# Patient Record
Sex: FEMALE | Race: WHITE
[De-identification: names, ages, dates, MRNs, and addresses within clinical notes are randomized per-mention and may not be internally consistent; named-entity substitution may affect disease eponyms.]

---

## 2017-02-27 ENCOUNTER — HOSPITAL ENCOUNTER (OUTPATIENT)
Dept: HOSPITAL 15 - LAB | Age: 72
Discharge: HOME | End: 2017-02-27
Attending: INTERNAL MEDICINE
Payer: COMMERCIAL

## 2017-02-27 ENCOUNTER — HOSPITAL ENCOUNTER (OUTPATIENT)
Dept: HOSPITAL 15 - XYW | Age: 72
Discharge: HOME | End: 2017-02-27
Attending: ORTHOPAEDIC SURGERY
Payer: COMMERCIAL

## 2017-02-27 DIAGNOSIS — M16.11: ICD-10-CM

## 2017-02-27 DIAGNOSIS — I05.0: ICD-10-CM

## 2017-02-27 DIAGNOSIS — I35.0: ICD-10-CM

## 2017-02-27 DIAGNOSIS — E11.9: Primary | ICD-10-CM

## 2017-02-27 DIAGNOSIS — Z01.818: Primary | ICD-10-CM

## 2017-02-27 DIAGNOSIS — Z01.812: ICD-10-CM

## 2017-02-27 DIAGNOSIS — I10: ICD-10-CM

## 2017-02-27 LAB
ALBUMIN SERPL-MCNC: 3.3 G/DL (ref 3.4–5)
ALP SERPL-CCNC: 125 U/L (ref 45–117)
ANION GAP SERPL CALCULATED.3IONS-SCNC: 9 MMOL/L (ref 5–15)
APTT PPP: 25.2 SEC (ref 22.64–33.71)
BILIRUB SERPL-MCNC: 0.3 MG/DL (ref 0.2–1)
BUN SERPL-MCNC: 44 MG/DL (ref 7–18)
BUN/CREAT SERPL: 32.4
CALCIUM SERPL-MCNC: 10.1 MG/DL (ref 8.5–10.1)
CHLORIDE SERPL-SCNC: 103 MMOL/L (ref 98–107)
CHOLEST SERPL-MCNC: 178 MG/DL (ref ?–200)
CO2 SERPL-SCNC: 33 MMOL/L (ref 21–32)
GLUCOSE SERPL-MCNC: 108 MG/DL (ref 74–106)
HCT VFR BLD AUTO: 41.2 % (ref 36–46)
HDLC SERPL-MCNC: 56 MG/DL (ref 40–59)
HGB BLD-MCNC: 13.3 G/DL (ref 12.2–16.2)
INR PPP: 0.99 (ref 0.9–1.15)
MCH RBC QN AUTO: 28.5 PG (ref 28–32)
MCV RBC AUTO: 88.6 FL (ref 80–100)
NRBC BLD QL AUTO: 0 %
POTASSIUM SERPL-SCNC: 4.1 MMOL/L (ref 3.5–5.1)
PROT SERPL-MCNC: 7.7 G/DL (ref 6.4–8.2)
PROTHROMBIN TIME: 10.2 SEC (ref 9.37–12.3)
SODIUM SERPL-SCNC: 145 MMOL/L (ref 136–145)
TRIGL SERPL-MCNC: 164 MG/DL (ref ?–150)
VIT B12 SERPL-MCNC: 536 PG/ML (ref 211–911)

## 2017-02-27 PROCEDURE — 80053 COMPREHEN METABOLIC PANEL: CPT

## 2017-02-27 PROCEDURE — 81001 URINALYSIS AUTO W/SCOPE: CPT

## 2017-02-27 PROCEDURE — 84443 ASSAY THYROID STIM HORMONE: CPT

## 2017-02-27 PROCEDURE — 82306 VITAMIN D 25 HYDROXY: CPT

## 2017-02-27 PROCEDURE — 93306 TTE W/DOPPLER COMPLETE: CPT

## 2017-02-27 PROCEDURE — 85610 PROTHROMBIN TIME: CPT

## 2017-02-27 PROCEDURE — 85025 COMPLETE CBC W/AUTO DIFF WBC: CPT

## 2017-02-27 PROCEDURE — 80061 LIPID PANEL: CPT

## 2017-02-27 PROCEDURE — 36415 COLL VENOUS BLD VENIPUNCTURE: CPT

## 2017-02-27 PROCEDURE — 84439 ASSAY OF FREE THYROXINE: CPT

## 2017-02-27 PROCEDURE — 85652 RBC SED RATE AUTOMATED: CPT

## 2017-02-27 PROCEDURE — 85730 THROMBOPLASTIN TIME PARTIAL: CPT

## 2017-02-27 PROCEDURE — 83036 HEMOGLOBIN GLYCOSYLATED A1C: CPT

## 2017-02-27 PROCEDURE — 82607 VITAMIN B-12: CPT

## 2017-03-14 ENCOUNTER — HOSPITAL ENCOUNTER (OUTPATIENT)
Dept: HOSPITAL 15 - XY | Age: 72
Discharge: HOME | End: 2017-03-14
Attending: INTERNAL MEDICINE
Payer: COMMERCIAL

## 2017-03-14 VITALS — HEIGHT: 67 IN | WEIGHT: 160 LBS | BODY MASS INDEX: 25.11 KG/M2

## 2017-03-14 DIAGNOSIS — Z01.810: Primary | ICD-10-CM

## 2017-03-14 PROCEDURE — 96374 THER/PROPH/DIAG INJ IV PUSH: CPT

## 2017-03-14 PROCEDURE — 78452 HT MUSCLE IMAGE SPECT MULT: CPT

## 2017-03-14 PROCEDURE — 93017 CV STRESS TEST TRACING ONLY: CPT

## 2017-03-20 ENCOUNTER — HOSPITAL ENCOUNTER (INPATIENT)
Dept: HOSPITAL 15 - ER | Age: 72
LOS: 2 days | Discharge: HOME | DRG: 314 | End: 2017-03-22
Attending: INTERNAL MEDICINE | Admitting: INTERNAL MEDICINE
Payer: COMMERCIAL

## 2017-03-20 VITALS — DIASTOLIC BLOOD PRESSURE: 81 MMHG | SYSTOLIC BLOOD PRESSURE: 142 MMHG

## 2017-03-20 VITALS — SYSTOLIC BLOOD PRESSURE: 137 MMHG | DIASTOLIC BLOOD PRESSURE: 89 MMHG

## 2017-03-20 VITALS — WEIGHT: 153.44 LBS | BODY MASS INDEX: 24.08 KG/M2 | HEIGHT: 67 IN

## 2017-03-20 VITALS — SYSTOLIC BLOOD PRESSURE: 148 MMHG | DIASTOLIC BLOOD PRESSURE: 72 MMHG

## 2017-03-20 DIAGNOSIS — N18.3: ICD-10-CM

## 2017-03-20 DIAGNOSIS — E11.21: ICD-10-CM

## 2017-03-20 DIAGNOSIS — Z90.710: ICD-10-CM

## 2017-03-20 DIAGNOSIS — Z85.41: ICD-10-CM

## 2017-03-20 DIAGNOSIS — M16.11: ICD-10-CM

## 2017-03-20 DIAGNOSIS — N39.0: ICD-10-CM

## 2017-03-20 DIAGNOSIS — E83.52: ICD-10-CM

## 2017-03-20 DIAGNOSIS — Z90.89: ICD-10-CM

## 2017-03-20 DIAGNOSIS — Z79.899: ICD-10-CM

## 2017-03-20 DIAGNOSIS — I12.9: ICD-10-CM

## 2017-03-20 DIAGNOSIS — R55: ICD-10-CM

## 2017-03-20 DIAGNOSIS — N17.0: ICD-10-CM

## 2017-03-20 DIAGNOSIS — E11.22: ICD-10-CM

## 2017-03-20 DIAGNOSIS — Z90.49: ICD-10-CM

## 2017-03-20 DIAGNOSIS — I95.9: Primary | ICD-10-CM

## 2017-03-20 DIAGNOSIS — B96.20: ICD-10-CM

## 2017-03-20 DIAGNOSIS — E78.5: ICD-10-CM

## 2017-03-20 LAB
ALBUMIN SERPL-MCNC: 3.4 G/DL (ref 3.4–5)
ALP SERPL-CCNC: 110 U/L (ref 45–117)
ANION GAP SERPL CALCULATED.3IONS-SCNC: 10 MMOL/L (ref 5–15)
BILIRUB SERPL-MCNC: 0.3 MG/DL (ref 0.2–1)
BUN SERPL-MCNC: 47 MG/DL (ref 7–18)
BUN/CREAT SERPL: 31.8
CALCIUM SERPL-MCNC: 10.4 MG/DL (ref 8.5–10.1)
CHLORIDE SERPL-SCNC: 103 MMOL/L (ref 98–107)
CO2 SERPL-SCNC: 31 MMOL/L (ref 21–32)
GLUCOSE SERPL-MCNC: 86 MG/DL (ref 74–106)
HCT VFR BLD AUTO: 41.7 % (ref 36–46)
HGB BLD-MCNC: 13.6 G/DL (ref 12.2–16.2)
MAGNESIUM SERPL-MCNC: 2.3 MG/DL (ref 1.6–2.6)
MCH RBC QN AUTO: 28.8 PG (ref 28–32)
MCV RBC AUTO: 88.5 FL (ref 80–100)
NRBC BLD QL AUTO: 0 %
POTASSIUM SERPL-SCNC: 4 MMOL/L (ref 3.5–5.1)
PROT SERPL-MCNC: 7.6 G/DL (ref 6.4–8.2)
SODIUM SERPL-SCNC: 144 MMOL/L (ref 136–145)

## 2017-03-20 PROCEDURE — 85025 COMPLETE CBC W/AUTO DIFF WBC: CPT

## 2017-03-20 PROCEDURE — 78582 LUNG VENTILAT&PERFUS IMAGING: CPT

## 2017-03-20 PROCEDURE — 80061 LIPID PANEL: CPT

## 2017-03-20 PROCEDURE — 84484 ASSAY OF TROPONIN QUANT: CPT

## 2017-03-20 PROCEDURE — 87088 URINE BACTERIA CULTURE: CPT

## 2017-03-20 PROCEDURE — 84443 ASSAY THYROID STIM HORMONE: CPT

## 2017-03-20 PROCEDURE — 80048 BASIC METABOLIC PNL TOTAL CA: CPT

## 2017-03-20 PROCEDURE — 93005 ELECTROCARDIOGRAM TRACING: CPT

## 2017-03-20 PROCEDURE — 72192 CT PELVIS W/O DYE: CPT

## 2017-03-20 PROCEDURE — 36415 COLL VENOUS BLD VENIPUNCTURE: CPT

## 2017-03-20 PROCEDURE — 82962 GLUCOSE BLOOD TEST: CPT

## 2017-03-20 PROCEDURE — 87086 URINE CULTURE/COLONY COUNT: CPT

## 2017-03-20 PROCEDURE — 80053 COMPREHEN METABOLIC PANEL: CPT

## 2017-03-20 PROCEDURE — 81001 URINALYSIS AUTO W/SCOPE: CPT

## 2017-03-20 PROCEDURE — 85379 FIBRIN DEGRADATION QUANT: CPT

## 2017-03-20 PROCEDURE — 93971 EXTREMITY STUDY: CPT

## 2017-03-20 PROCEDURE — 87186 SC STD MICRODIL/AGAR DIL: CPT

## 2017-03-20 PROCEDURE — 94761 N-INVAS EAR/PLS OXIMETRY MLT: CPT

## 2017-03-20 PROCEDURE — 83036 HEMOGLOBIN GLYCOSYLATED A1C: CPT

## 2017-03-20 PROCEDURE — 83735 ASSAY OF MAGNESIUM: CPT

## 2017-03-20 RX ADMIN — SODIUM CHLORIDE SCH ML: 9 INJECTION INTRAMUSCULAR; INTRAVENOUS; SUBCUTANEOUS at 14:21

## 2017-03-20 RX ADMIN — HUMAN INSULIN SCH UNITS: 100 INJECTION, SOLUTION SUBCUTANEOUS at 22:00

## 2017-03-20 RX ADMIN — Medication SCH STRIP: at 17:01

## 2017-03-20 RX ADMIN — HUMAN INSULIN SCH UNITS: 100 INJECTION, SOLUTION SUBCUTANEOUS at 17:00

## 2017-03-20 RX ADMIN — Medication SCH STRIP: at 22:09

## 2017-03-20 RX ADMIN — HYDROCODONE BITARTRATE AND ACETAMINOPHEN PRN TAB: 5; 325 TABLET ORAL at 22:17

## 2017-03-20 RX ADMIN — SODIUM CHLORIDE SCH ML: 9 INJECTION INTRAMUSCULAR; INTRAVENOUS; SUBCUTANEOUS at 22:09

## 2017-03-21 VITALS — DIASTOLIC BLOOD PRESSURE: 89 MMHG | SYSTOLIC BLOOD PRESSURE: 160 MMHG

## 2017-03-21 VITALS — DIASTOLIC BLOOD PRESSURE: 73 MMHG | SYSTOLIC BLOOD PRESSURE: 111 MMHG

## 2017-03-21 VITALS — SYSTOLIC BLOOD PRESSURE: 146 MMHG | DIASTOLIC BLOOD PRESSURE: 76 MMHG

## 2017-03-21 VITALS — SYSTOLIC BLOOD PRESSURE: 146 MMHG | DIASTOLIC BLOOD PRESSURE: 92 MMHG

## 2017-03-21 VITALS — DIASTOLIC BLOOD PRESSURE: 66 MMHG | SYSTOLIC BLOOD PRESSURE: 152 MMHG

## 2017-03-21 VITALS — SYSTOLIC BLOOD PRESSURE: 135 MMHG | DIASTOLIC BLOOD PRESSURE: 75 MMHG

## 2017-03-21 VITALS — DIASTOLIC BLOOD PRESSURE: 74 MMHG | SYSTOLIC BLOOD PRESSURE: 166 MMHG

## 2017-03-21 VITALS — DIASTOLIC BLOOD PRESSURE: 70 MMHG | SYSTOLIC BLOOD PRESSURE: 106 MMHG

## 2017-03-21 LAB
ALBUMIN SERPL-MCNC: 3.1 G/DL (ref 3.4–5)
ALP SERPL-CCNC: 97 U/L (ref 45–117)
ANION GAP SERPL CALCULATED.3IONS-SCNC: 12 MMOL/L (ref 5–15)
BILIRUB SERPL-MCNC: 0.4 MG/DL (ref 0.2–1)
BUN SERPL-MCNC: 40 MG/DL (ref 7–18)
BUN/CREAT SERPL: 35.1
CALCIUM SERPL-MCNC: 9.6 MG/DL (ref 8.5–10.1)
CHLORIDE SERPL-SCNC: 104 MMOL/L (ref 98–107)
CO2 SERPL-SCNC: 30 MMOL/L (ref 21–32)
GLUCOSE SERPL-MCNC: 137 MG/DL (ref 74–106)
HCT VFR BLD AUTO: 39.5 % (ref 36–46)
HGB BLD-MCNC: 12.8 G/DL (ref 12.2–16.2)
MCH RBC QN AUTO: 28.7 PG (ref 28–32)
MCV RBC AUTO: 88.8 FL (ref 80–100)
NRBC BLD QL AUTO: 0 %
POTASSIUM SERPL-SCNC: 3.5 MMOL/L (ref 3.5–5.1)
PROT SERPL-MCNC: 7.2 G/DL (ref 6.4–8.2)
SODIUM SERPL-SCNC: 146 MMOL/L (ref 136–145)

## 2017-03-21 RX ADMIN — SODIUM CHLORIDE SCH MLS/HR: 0.9 INJECTION, SOLUTION INTRAVENOUS at 18:10

## 2017-03-21 RX ADMIN — LISINOPRIL SCH MG: 20 TABLET ORAL at 09:49

## 2017-03-21 RX ADMIN — CHOLESTYRAMINE SCH GM: 4 POWDER, FOR SUSPENSION ORAL at 11:00

## 2017-03-21 RX ADMIN — CEFTRIAXONE SODIUM SCH MLS/HR: 1 INJECTION, POWDER, FOR SOLUTION INTRAMUSCULAR; INTRAVENOUS at 09:33

## 2017-03-21 RX ADMIN — HYDROCODONE BITARTRATE AND ACETAMINOPHEN PRN TAB: 5; 325 TABLET ORAL at 15:29

## 2017-03-21 RX ADMIN — SODIUM CHLORIDE SCH ML: 9 INJECTION INTRAMUSCULAR; INTRAVENOUS; SUBCUTANEOUS at 14:19

## 2017-03-21 RX ADMIN — METOPROLOL SUCCINATE SCH MG: 50 TABLET, EXTENDED RELEASE ORAL at 09:48

## 2017-03-21 RX ADMIN — HYDROCODONE BITARTRATE AND ACETAMINOPHEN PRN TAB: 5; 325 TABLET ORAL at 21:48

## 2017-03-21 RX ADMIN — MULTIVITAMIN TABLET SCH TAB: TABLET at 09:47

## 2017-03-21 RX ADMIN — SODIUM CHLORIDE SCH ML: 9 INJECTION INTRAMUSCULAR; INTRAVENOUS; SUBCUTANEOUS at 06:29

## 2017-03-21 RX ADMIN — ASPIRIN SCH MG: 81 TABLET ORAL at 09:37

## 2017-03-21 RX ADMIN — SODIUM CHLORIDE SCH ML: 9 INJECTION INTRAMUSCULAR; INTRAVENOUS; SUBCUTANEOUS at 22:16

## 2017-03-21 RX ADMIN — HUMAN INSULIN SCH UNITS: 100 INJECTION, SOLUTION SUBCUTANEOUS at 22:00

## 2017-03-21 RX ADMIN — Medication SCH STRIP: at 16:41

## 2017-03-21 RX ADMIN — HUMAN INSULIN SCH UNITS: 100 INJECTION, SOLUTION SUBCUTANEOUS at 16:42

## 2017-03-21 RX ADMIN — FAMOTIDINE SCH MG: 20 TABLET, FILM COATED ORAL at 09:33

## 2017-03-21 RX ADMIN — FAMOTIDINE SCH MG: 20 TABLET, FILM COATED ORAL at 21:48

## 2017-03-21 RX ADMIN — CHOLESTYRAMINE SCH GM: 4 POWDER, FOR SUSPENSION ORAL at 11:14

## 2017-03-21 RX ADMIN — HYDROCODONE BITARTRATE AND ACETAMINOPHEN PRN TAB: 5; 325 TABLET ORAL at 07:07

## 2017-03-21 RX ADMIN — HUMAN INSULIN SCH UNITS: 100 INJECTION, SOLUTION SUBCUTANEOUS at 11:30

## 2017-03-21 RX ADMIN — HUMAN INSULIN SCH UNITS: 100 INJECTION, SOLUTION SUBCUTANEOUS at 06:30

## 2017-03-21 RX ADMIN — Medication SCH STRIP: at 22:16

## 2017-03-21 RX ADMIN — Medication SCH STRIP: at 11:14

## 2017-03-21 RX ADMIN — Medication SCH STRIP: at 06:29

## 2017-03-22 VITALS — DIASTOLIC BLOOD PRESSURE: 88 MMHG | SYSTOLIC BLOOD PRESSURE: 160 MMHG

## 2017-03-22 VITALS — SYSTOLIC BLOOD PRESSURE: 134 MMHG | DIASTOLIC BLOOD PRESSURE: 77 MMHG

## 2017-03-22 VITALS — SYSTOLIC BLOOD PRESSURE: 123 MMHG | DIASTOLIC BLOOD PRESSURE: 86 MMHG

## 2017-03-22 LAB
ANION GAP SERPL CALCULATED.3IONS-SCNC: 9 MMOL/L (ref 5–15)
BUN SERPL-MCNC: 29 MG/DL (ref 7–18)
BUN/CREAT SERPL: 27.6
CALCIUM SERPL-MCNC: 9.5 MG/DL (ref 8.5–10.1)
CHLORIDE SERPL-SCNC: 104 MMOL/L (ref 98–107)
CHOLEST SERPL-MCNC: 187 MG/DL (ref ?–200)
CO2 SERPL-SCNC: 29 MMOL/L (ref 21–32)
GLUCOSE SERPL-MCNC: 128 MG/DL (ref 74–106)
HDLC SERPL-MCNC: 53 MG/DL (ref 40–59)
POTASSIUM SERPL-SCNC: 3.9 MMOL/L (ref 3.5–5.1)
SODIUM SERPL-SCNC: 142 MMOL/L (ref 136–145)
TRIGL SERPL-MCNC: 126 MG/DL (ref ?–150)

## 2017-03-22 RX ADMIN — HUMAN INSULIN SCH UNITS: 100 INJECTION, SOLUTION SUBCUTANEOUS at 06:05

## 2017-03-22 RX ADMIN — ASPIRIN SCH MG: 81 TABLET ORAL at 09:54

## 2017-03-22 RX ADMIN — METOPROLOL SUCCINATE SCH MG: 50 TABLET, EXTENDED RELEASE ORAL at 09:57

## 2017-03-22 RX ADMIN — HUMAN INSULIN SCH UNITS: 100 INJECTION, SOLUTION SUBCUTANEOUS at 11:29

## 2017-03-22 RX ADMIN — SODIUM CHLORIDE SCH ML: 9 INJECTION INTRAMUSCULAR; INTRAVENOUS; SUBCUTANEOUS at 05:45

## 2017-03-22 RX ADMIN — SODIUM CHLORIDE SCH ML: 9 INJECTION INTRAMUSCULAR; INTRAVENOUS; SUBCUTANEOUS at 13:48

## 2017-03-22 RX ADMIN — Medication SCH STRIP: at 11:29

## 2017-03-22 RX ADMIN — SODIUM CHLORIDE SCH MLS/HR: 0.9 INJECTION, SOLUTION INTRAVENOUS at 08:13

## 2017-03-22 RX ADMIN — LISINOPRIL SCH MG: 20 TABLET ORAL at 09:58

## 2017-03-22 RX ADMIN — MULTIVITAMIN TABLET SCH TAB: TABLET at 09:54

## 2017-03-22 RX ADMIN — CHOLESTYRAMINE SCH GM: 4 POWDER, FOR SUSPENSION ORAL at 10:32

## 2017-03-22 RX ADMIN — FAMOTIDINE SCH MG: 20 TABLET, FILM COATED ORAL at 08:14

## 2017-03-22 RX ADMIN — CEFTRIAXONE SODIUM SCH MLS/HR: 1 INJECTION, POWDER, FOR SOLUTION INTRAMUSCULAR; INTRAVENOUS at 08:13

## 2017-03-22 RX ADMIN — HYDROCODONE BITARTRATE AND ACETAMINOPHEN PRN TAB: 5; 325 TABLET ORAL at 08:14

## 2017-03-22 RX ADMIN — Medication SCH STRIP: at 06:05

## 2017-04-03 ENCOUNTER — HOSPITAL ENCOUNTER (OUTPATIENT)
Dept: HOSPITAL 15 - LAB | Age: 72
Discharge: HOME | End: 2017-04-03
Attending: INTERNAL MEDICINE
Payer: COMMERCIAL

## 2017-04-03 DIAGNOSIS — Z00.00: Primary | ICD-10-CM

## 2017-04-03 LAB
ANION GAP SERPL CALCULATED.3IONS-SCNC: 7 MMOL/L (ref 5–15)
BUN SERPL-MCNC: 42 MG/DL (ref 7–18)
BUN/CREAT SERPL: 32.3
CALCIUM SERPL-MCNC: 10.1 MG/DL (ref 8.5–10.1)
CHLORIDE SERPL-SCNC: 103 MMOL/L (ref 98–107)
CO2 SERPL-SCNC: 34 MMOL/L (ref 21–32)
GLUCOSE SERPL-MCNC: 131 MG/DL (ref 74–106)
POTASSIUM SERPL-SCNC: 4.2 MMOL/L (ref 3.5–5.1)
SODIUM SERPL-SCNC: 144 MMOL/L (ref 136–145)

## 2017-04-03 PROCEDURE — 84443 ASSAY THYROID STIM HORMONE: CPT

## 2017-04-03 PROCEDURE — 80048 BASIC METABOLIC PNL TOTAL CA: CPT

## 2017-04-03 PROCEDURE — 36415 COLL VENOUS BLD VENIPUNCTURE: CPT

## 2017-05-05 LAB
ALBUMIN SERPL-MCNC: 3.5 G/DL (ref 3.4–5)
ALP SERPL-CCNC: 103 U/L (ref 45–117)
ANION GAP SERPL CALCULATED.3IONS-SCNC: 6 MMOL/L (ref 5–15)
APTT PPP: 24.5 SEC (ref 22.64–33.71)
BILIRUB SERPL-MCNC: 0.4 MG/DL (ref 0.2–1)
BUN SERPL-MCNC: 47 MG/DL (ref 7–18)
BUN/CREAT SERPL: 37.3
CALCIUM SERPL-MCNC: 10.1 MG/DL (ref 8.5–10.1)
CHLORIDE SERPL-SCNC: 100 MMOL/L (ref 98–107)
CO2 SERPL-SCNC: 32 MMOL/L (ref 21–32)
GLUCOSE SERPL-MCNC: 105 MG/DL (ref 74–106)
HCT VFR BLD AUTO: 40.3 % (ref 36–46)
HGB BLD-MCNC: 13.1 G/DL (ref 12.2–16.2)
INR PPP: 0.93 (ref 0.9–1.15)
MCH RBC QN AUTO: 29.5 PG (ref 28–32)
MCV RBC AUTO: 90.5 FL (ref 80–100)
NRBC BLD QL AUTO: 0 %
POTASSIUM SERPL-SCNC: 3.8 MMOL/L (ref 3.5–5.1)
PROT SERPL-MCNC: 7.8 G/DL (ref 6.4–8.2)
PROTHROMBIN TIME: 10 SEC (ref 9.37–12.3)
SODIUM SERPL-SCNC: 138 MMOL/L (ref 136–145)

## 2017-05-09 ENCOUNTER — HOSPITAL ENCOUNTER (INPATIENT)
Dept: HOSPITAL 15 - SUR | Age: 72
LOS: 7 days | Discharge: HOME | DRG: 470 | End: 2017-05-16
Attending: INTERNAL MEDICINE | Admitting: ORTHOPAEDIC SURGERY
Payer: COMMERCIAL

## 2017-05-09 VITALS — SYSTOLIC BLOOD PRESSURE: 114 MMHG | DIASTOLIC BLOOD PRESSURE: 66 MMHG

## 2017-05-09 VITALS — SYSTOLIC BLOOD PRESSURE: 121 MMHG | DIASTOLIC BLOOD PRESSURE: 59 MMHG

## 2017-05-09 VITALS — HEIGHT: 65 IN | WEIGHT: 168.65 LBS | BODY MASS INDEX: 28.1 KG/M2

## 2017-05-09 VITALS — DIASTOLIC BLOOD PRESSURE: 59 MMHG | SYSTOLIC BLOOD PRESSURE: 121 MMHG

## 2017-05-09 DIAGNOSIS — N18.3: ICD-10-CM

## 2017-05-09 DIAGNOSIS — Z85.41: ICD-10-CM

## 2017-05-09 DIAGNOSIS — Z92.3: ICD-10-CM

## 2017-05-09 DIAGNOSIS — E11.22: ICD-10-CM

## 2017-05-09 DIAGNOSIS — M16.11: Primary | ICD-10-CM

## 2017-05-09 DIAGNOSIS — M70.60: ICD-10-CM

## 2017-05-09 DIAGNOSIS — I12.9: ICD-10-CM

## 2017-05-09 PROCEDURE — 86850 RBC ANTIBODY SCREEN: CPT

## 2017-05-09 PROCEDURE — 87205 SMEAR GRAM STAIN: CPT

## 2017-05-09 PROCEDURE — 80053 COMPREHEN METABOLIC PANEL: CPT

## 2017-05-09 PROCEDURE — 84132 ASSAY OF SERUM POTASSIUM: CPT

## 2017-05-09 PROCEDURE — 97116 GAIT TRAINING THERAPY: CPT

## 2017-05-09 PROCEDURE — 0SR90JZ REPLACEMENT OF RIGHT HIP JOINT WITH SYNTHETIC SUBSTITUTE, OPEN APPROACH: ICD-10-PCS | Performed by: ORTHOPAEDIC SURGERY

## 2017-05-09 PROCEDURE — 86901 BLOOD TYPING SEROLOGIC RH(D): CPT

## 2017-05-09 PROCEDURE — 80048 BASIC METABOLIC PNL TOTAL CA: CPT

## 2017-05-09 PROCEDURE — 76775 US EXAM ABDO BACK WALL LIM: CPT

## 2017-05-09 PROCEDURE — 87075 CULTR BACTERIA EXCEPT BLOOD: CPT

## 2017-05-09 PROCEDURE — 85014 HEMATOCRIT: CPT

## 2017-05-09 PROCEDURE — 97530 THERAPEUTIC ACTIVITIES: CPT

## 2017-05-09 PROCEDURE — 73501 X-RAY EXAM HIP UNI 1 VIEW: CPT

## 2017-05-09 PROCEDURE — 86900 BLOOD TYPING SEROLOGIC ABO: CPT

## 2017-05-09 PROCEDURE — 85025 COMPLETE CBC W/AUTO DIFF WBC: CPT

## 2017-05-09 PROCEDURE — 85730 THROMBOPLASTIN TIME PARTIAL: CPT

## 2017-05-09 PROCEDURE — 85610 PROTHROMBIN TIME: CPT

## 2017-05-09 PROCEDURE — 81001 URINALYSIS AUTO W/SCOPE: CPT

## 2017-05-09 PROCEDURE — 36415 COLL VENOUS BLD VENIPUNCTURE: CPT

## 2017-05-09 PROCEDURE — 97110 THERAPEUTIC EXERCISES: CPT

## 2017-05-09 PROCEDURE — 87070 CULTURE OTHR SPECIMN AEROBIC: CPT

## 2017-05-09 PROCEDURE — 85018 HEMOGLOBIN: CPT

## 2017-05-09 RX ADMIN — HYDROCODONE BITARTRATE AND ACETAMINOPHEN PRN TAB: 10; 325 TABLET ORAL at 19:41

## 2017-05-09 RX ADMIN — SODIUM CHLORIDE, SODIUM LACTATE, POTASSIUM CHLORIDE, AND CALCIUM CHLORIDE SCH MLS/HR: .6; .31; .03; .02 INJECTION, SOLUTION INTRAVENOUS at 18:19

## 2017-05-09 RX ADMIN — CEFAZOLIN SODIUM SCH MLS/HR: 1 INJECTION, SOLUTION INTRAVENOUS at 18:19

## 2017-05-09 RX ADMIN — HYDROMORPHONE HYDROCHLORIDE PRN MG: 2 INJECTION, SOLUTION INTRAMUSCULAR; INTRAVENOUS; SUBCUTANEOUS at 23:13

## 2017-05-09 RX ADMIN — HYDROMORPHONE HYDROCHLORIDE PRN MG: 2 INJECTION, SOLUTION INTRAMUSCULAR; INTRAVENOUS; SUBCUTANEOUS at 20:53

## 2017-05-09 RX ADMIN — DOCUSATE SODIUM SCH MG: 100 CAPSULE, LIQUID FILLED ORAL at 22:13

## 2017-05-09 RX ADMIN — HYDROMORPHONE HYDROCHLORIDE PRN MG: 2 INJECTION, SOLUTION INTRAMUSCULAR; INTRAVENOUS; SUBCUTANEOUS at 18:48

## 2017-05-09 RX ADMIN — SODIUM CHLORIDE SCH ML: 9 INJECTION INTRAMUSCULAR; INTRAVENOUS; SUBCUTANEOUS at 22:13

## 2017-05-10 VITALS — DIASTOLIC BLOOD PRESSURE: 67 MMHG | SYSTOLIC BLOOD PRESSURE: 129 MMHG

## 2017-05-10 VITALS — DIASTOLIC BLOOD PRESSURE: 59 MMHG | SYSTOLIC BLOOD PRESSURE: 122 MMHG

## 2017-05-10 VITALS — DIASTOLIC BLOOD PRESSURE: 59 MMHG | SYSTOLIC BLOOD PRESSURE: 112 MMHG

## 2017-05-10 VITALS — DIASTOLIC BLOOD PRESSURE: 67 MMHG | SYSTOLIC BLOOD PRESSURE: 131 MMHG

## 2017-05-10 VITALS — DIASTOLIC BLOOD PRESSURE: 60 MMHG | SYSTOLIC BLOOD PRESSURE: 112 MMHG

## 2017-05-10 LAB
HCT VFR BLD AUTO: 35.9 % (ref 36–46)
HGB BLD-MCNC: 11.7 G/DL (ref 12.2–16.2)

## 2017-05-10 RX ADMIN — HYDROCODONE BITARTRATE AND ACETAMINOPHEN PRN TAB: 10; 325 TABLET ORAL at 06:53

## 2017-05-10 RX ADMIN — TEMAZEPAM PRN MG: 15 CAPSULE ORAL at 01:52

## 2017-05-10 RX ADMIN — HYDROCODONE BITARTRATE AND ACETAMINOPHEN PRN TAB: 10; 325 TABLET ORAL at 15:44

## 2017-05-10 RX ADMIN — SODIUM CHLORIDE SCH ML: 9 INJECTION INTRAMUSCULAR; INTRAVENOUS; SUBCUTANEOUS at 06:03

## 2017-05-10 RX ADMIN — DOCUSATE SODIUM SCH MG: 100 CAPSULE, LIQUID FILLED ORAL at 20:59

## 2017-05-10 RX ADMIN — HYDROMORPHONE HYDROCHLORIDE PRN MG: 2 INJECTION, SOLUTION INTRAMUSCULAR; INTRAVENOUS; SUBCUTANEOUS at 01:52

## 2017-05-10 RX ADMIN — HYDROMORPHONE HYDROCHLORIDE PRN MG: 2 INJECTION, SOLUTION INTRAMUSCULAR; INTRAVENOUS; SUBCUTANEOUS at 07:56

## 2017-05-10 RX ADMIN — ENOXAPARIN SODIUM SCH MG: 40 INJECTION SUBCUTANEOUS at 10:04

## 2017-05-10 RX ADMIN — MORPHINE SULFATE PRN MG: 2 INJECTION, SOLUTION INTRAMUSCULAR; INTRAVENOUS at 18:49

## 2017-05-10 RX ADMIN — HYDROMORPHONE HYDROCHLORIDE PRN MG: 2 INJECTION, SOLUTION INTRAMUSCULAR; INTRAVENOUS; SUBCUTANEOUS at 06:04

## 2017-05-10 RX ADMIN — CEFAZOLIN SODIUM SCH MLS/HR: 1 INJECTION, SOLUTION INTRAVENOUS at 00:14

## 2017-05-10 RX ADMIN — SODIUM CHLORIDE SCH ML: 9 INJECTION INTRAMUSCULAR; INTRAVENOUS; SUBCUTANEOUS at 20:59

## 2017-05-10 RX ADMIN — SODIUM CHLORIDE SCH ML: 9 INJECTION INTRAMUSCULAR; INTRAVENOUS; SUBCUTANEOUS at 14:03

## 2017-05-10 RX ADMIN — CEFAZOLIN SODIUM SCH MLS/HR: 1 INJECTION, SOLUTION INTRAVENOUS at 06:03

## 2017-05-10 RX ADMIN — DOCUSATE SODIUM SCH MG: 100 CAPSULE, LIQUID FILLED ORAL at 10:03

## 2017-05-10 RX ADMIN — SODIUM CHLORIDE, SODIUM LACTATE, POTASSIUM CHLORIDE, AND CALCIUM CHLORIDE SCH MLS/HR: .6; .31; .03; .02 INJECTION, SOLUTION INTRAVENOUS at 12:25

## 2017-05-10 RX ADMIN — HYDROCODONE BITARTRATE AND ACETAMINOPHEN PRN TAB: 10; 325 TABLET ORAL at 10:49

## 2017-05-11 VITALS — SYSTOLIC BLOOD PRESSURE: 98 MMHG | DIASTOLIC BLOOD PRESSURE: 60 MMHG

## 2017-05-11 VITALS — SYSTOLIC BLOOD PRESSURE: 149 MMHG | DIASTOLIC BLOOD PRESSURE: 77 MMHG

## 2017-05-11 VITALS — SYSTOLIC BLOOD PRESSURE: 116 MMHG | DIASTOLIC BLOOD PRESSURE: 72 MMHG

## 2017-05-11 VITALS — SYSTOLIC BLOOD PRESSURE: 96 MMHG | DIASTOLIC BLOOD PRESSURE: 61 MMHG

## 2017-05-11 VITALS — DIASTOLIC BLOOD PRESSURE: 80 MMHG | SYSTOLIC BLOOD PRESSURE: 157 MMHG

## 2017-05-11 LAB
ANION GAP SERPL CALCULATED.3IONS-SCNC: 7 MMOL/L (ref 5–15)
BUN SERPL-MCNC: 24 MG/DL (ref 7–18)
BUN/CREAT SERPL: 19.7
CALCIUM SERPL-MCNC: 9.6 MG/DL (ref 8.5–10.1)
CHLORIDE SERPL-SCNC: 98 MMOL/L (ref 98–107)
CO2 SERPL-SCNC: 31 MMOL/L (ref 21–32)
GLUCOSE SERPL-MCNC: 150 MG/DL (ref 74–106)
HCT VFR BLD AUTO: 35.2 % (ref 36–46)
HGB BLD-MCNC: 11.7 G/DL (ref 12.2–16.2)
POTASSIUM SERPL-SCNC: 3.6 MMOL/L (ref 3.5–5.1)
SODIUM SERPL-SCNC: 136 MMOL/L (ref 136–145)

## 2017-05-11 RX ADMIN — MORPHINE SULFATE PRN MG: 2 INJECTION, SOLUTION INTRAMUSCULAR; INTRAVENOUS at 05:25

## 2017-05-11 RX ADMIN — SODIUM CHLORIDE SCH ML: 9 INJECTION INTRAMUSCULAR; INTRAVENOUS; SUBCUTANEOUS at 21:36

## 2017-05-11 RX ADMIN — HYDROCODONE BITARTRATE AND ACETAMINOPHEN PRN TAB: 10; 325 TABLET ORAL at 08:41

## 2017-05-11 RX ADMIN — HYDROMORPHONE HYDROCHLORIDE PRN MG: 2 INJECTION, SOLUTION INTRAMUSCULAR; INTRAVENOUS; SUBCUTANEOUS at 16:38

## 2017-05-11 RX ADMIN — ENOXAPARIN SODIUM SCH MG: 40 INJECTION SUBCUTANEOUS at 10:36

## 2017-05-11 RX ADMIN — DOCUSATE SODIUM SCH MG: 100 CAPSULE, LIQUID FILLED ORAL at 10:36

## 2017-05-11 RX ADMIN — DOCUSATE SODIUM SCH MG: 100 CAPSULE, LIQUID FILLED ORAL at 21:36

## 2017-05-11 RX ADMIN — SODIUM CHLORIDE SCH ML: 9 INJECTION INTRAMUSCULAR; INTRAVENOUS; SUBCUTANEOUS at 14:41

## 2017-05-11 RX ADMIN — HYDROMORPHONE HYDROCHLORIDE PRN MG: 2 INJECTION, SOLUTION INTRAMUSCULAR; INTRAVENOUS; SUBCUTANEOUS at 10:36

## 2017-05-11 RX ADMIN — SODIUM CHLORIDE SCH ML: 9 INJECTION INTRAMUSCULAR; INTRAVENOUS; SUBCUTANEOUS at 05:25

## 2017-05-11 RX ADMIN — CELECOXIB SCH MG: 100 CAPSULE ORAL at 10:00

## 2017-05-12 VITALS — SYSTOLIC BLOOD PRESSURE: 148 MMHG | DIASTOLIC BLOOD PRESSURE: 71 MMHG

## 2017-05-12 VITALS — SYSTOLIC BLOOD PRESSURE: 117 MMHG | DIASTOLIC BLOOD PRESSURE: 73 MMHG

## 2017-05-12 VITALS — DIASTOLIC BLOOD PRESSURE: 56 MMHG | SYSTOLIC BLOOD PRESSURE: 97 MMHG

## 2017-05-12 VITALS — SYSTOLIC BLOOD PRESSURE: 131 MMHG | DIASTOLIC BLOOD PRESSURE: 94 MMHG

## 2017-05-12 VITALS — SYSTOLIC BLOOD PRESSURE: 166 MMHG | DIASTOLIC BLOOD PRESSURE: 78 MMHG

## 2017-05-12 LAB
ANION GAP SERPL CALCULATED.3IONS-SCNC: 12 MMOL/L (ref 5–15)
BUN SERPL-MCNC: 31 MG/DL (ref 7–18)
BUN/CREAT SERPL: 19.7
CALCIUM SERPL-MCNC: 10 MG/DL (ref 8.5–10.1)
CHLORIDE SERPL-SCNC: 95 MMOL/L (ref 98–107)
CO2 SERPL-SCNC: 28 MMOL/L (ref 21–32)
GLUCOSE SERPL-MCNC: 168 MG/DL (ref 74–106)
HCT VFR BLD AUTO: 35.8 % (ref 36–46)
HGB BLD-MCNC: 11.9 G/DL (ref 12.2–16.2)
POTASSIUM SERPL-SCNC: 3.6 MMOL/L (ref 3.5–5.1)
SODIUM SERPL-SCNC: 135 MMOL/L (ref 136–145)

## 2017-05-12 RX ADMIN — DOCUSATE SODIUM SCH MG: 100 CAPSULE, LIQUID FILLED ORAL at 08:43

## 2017-05-12 RX ADMIN — HYDROMORPHONE HYDROCHLORIDE PRN MG: 2 INJECTION, SOLUTION INTRAMUSCULAR; INTRAVENOUS; SUBCUTANEOUS at 14:29

## 2017-05-12 RX ADMIN — ENOXAPARIN SODIUM SCH MG: 40 INJECTION SUBCUTANEOUS at 08:44

## 2017-05-12 RX ADMIN — SODIUM CHLORIDE SCH ML: 9 INJECTION INTRAMUSCULAR; INTRAVENOUS; SUBCUTANEOUS at 14:04

## 2017-05-12 RX ADMIN — DOCUSATE SODIUM SCH MG: 100 CAPSULE, LIQUID FILLED ORAL at 21:40

## 2017-05-12 RX ADMIN — SODIUM CHLORIDE SCH ML: 9 INJECTION INTRAMUSCULAR; INTRAVENOUS; SUBCUTANEOUS at 05:42

## 2017-05-12 RX ADMIN — HYDROCODONE BITARTRATE AND ACETAMINOPHEN PRN TAB: 10; 325 TABLET ORAL at 04:41

## 2017-05-12 RX ADMIN — CELECOXIB SCH MG: 100 CAPSULE ORAL at 08:43

## 2017-05-12 RX ADMIN — SODIUM CHLORIDE SCH ML: 9 INJECTION INTRAMUSCULAR; INTRAVENOUS; SUBCUTANEOUS at 21:39

## 2017-05-13 VITALS — SYSTOLIC BLOOD PRESSURE: 143 MMHG | DIASTOLIC BLOOD PRESSURE: 82 MMHG

## 2017-05-13 VITALS — DIASTOLIC BLOOD PRESSURE: 82 MMHG | SYSTOLIC BLOOD PRESSURE: 143 MMHG

## 2017-05-13 VITALS — DIASTOLIC BLOOD PRESSURE: 66 MMHG | SYSTOLIC BLOOD PRESSURE: 142 MMHG

## 2017-05-13 VITALS — SYSTOLIC BLOOD PRESSURE: 142 MMHG | DIASTOLIC BLOOD PRESSURE: 66 MMHG

## 2017-05-13 VITALS — SYSTOLIC BLOOD PRESSURE: 169 MMHG | DIASTOLIC BLOOD PRESSURE: 92 MMHG

## 2017-05-13 VITALS — SYSTOLIC BLOOD PRESSURE: 152 MMHG | DIASTOLIC BLOOD PRESSURE: 97 MMHG

## 2017-05-13 VITALS — SYSTOLIC BLOOD PRESSURE: 179 MMHG | DIASTOLIC BLOOD PRESSURE: 73 MMHG

## 2017-05-13 LAB
ANION GAP SERPL CALCULATED.3IONS-SCNC: 11 MMOL/L (ref 5–15)
BUN SERPL-MCNC: 33 MG/DL (ref 7–18)
BUN/CREAT SERPL: 24.8
CALCIUM SERPL-MCNC: 9.7 MG/DL (ref 8.5–10.1)
CHLORIDE SERPL-SCNC: 97 MMOL/L (ref 98–107)
CO2 SERPL-SCNC: 29 MMOL/L (ref 21–32)
GLUCOSE SERPL-MCNC: 197 MG/DL (ref 74–106)
HCT VFR BLD AUTO: 33.9 % (ref 36–46)
HGB BLD-MCNC: 11.2 G/DL (ref 12.2–16.2)
POTASSIUM SERPL-SCNC: 3.2 MMOL/L (ref 3.5–5.1)
SODIUM SERPL-SCNC: 137 MMOL/L (ref 136–145)

## 2017-05-13 RX ADMIN — HYDROMORPHONE HYDROCHLORIDE PRN MG: 2 INJECTION, SOLUTION INTRAMUSCULAR; INTRAVENOUS; SUBCUTANEOUS at 11:51

## 2017-05-13 RX ADMIN — SODIUM CHLORIDE SCH ML: 9 INJECTION INTRAMUSCULAR; INTRAVENOUS; SUBCUTANEOUS at 21:52

## 2017-05-13 RX ADMIN — ENOXAPARIN SODIUM SCH MG: 40 INJECTION SUBCUTANEOUS at 10:18

## 2017-05-13 RX ADMIN — DOCUSATE SODIUM SCH MG: 100 CAPSULE, LIQUID FILLED ORAL at 10:18

## 2017-05-13 RX ADMIN — HYDROMORPHONE HYDROCHLORIDE PRN MG: 2 INJECTION, SOLUTION INTRAMUSCULAR; INTRAVENOUS; SUBCUTANEOUS at 06:25

## 2017-05-13 RX ADMIN — SODIUM CHLORIDE SCH ML: 9 INJECTION INTRAMUSCULAR; INTRAVENOUS; SUBCUTANEOUS at 06:13

## 2017-05-13 RX ADMIN — SODIUM CHLORIDE SCH ML: 9 INJECTION INTRAMUSCULAR; INTRAVENOUS; SUBCUTANEOUS at 16:54

## 2017-05-13 RX ADMIN — DOCUSATE SODIUM SCH MG: 100 CAPSULE, LIQUID FILLED ORAL at 21:44

## 2017-05-13 RX ADMIN — HYDROMORPHONE HYDROCHLORIDE PRN MG: 2 INJECTION, SOLUTION INTRAMUSCULAR; INTRAVENOUS; SUBCUTANEOUS at 16:54

## 2017-05-14 VITALS — SYSTOLIC BLOOD PRESSURE: 132 MMHG | DIASTOLIC BLOOD PRESSURE: 92 MMHG

## 2017-05-14 VITALS — SYSTOLIC BLOOD PRESSURE: 145 MMHG | DIASTOLIC BLOOD PRESSURE: 91 MMHG

## 2017-05-14 VITALS — DIASTOLIC BLOOD PRESSURE: 68 MMHG | SYSTOLIC BLOOD PRESSURE: 122 MMHG

## 2017-05-14 VITALS — DIASTOLIC BLOOD PRESSURE: 91 MMHG | SYSTOLIC BLOOD PRESSURE: 145 MMHG

## 2017-05-14 VITALS — SYSTOLIC BLOOD PRESSURE: 129 MMHG | DIASTOLIC BLOOD PRESSURE: 63 MMHG

## 2017-05-14 VITALS — DIASTOLIC BLOOD PRESSURE: 78 MMHG | SYSTOLIC BLOOD PRESSURE: 123 MMHG

## 2017-05-14 VITALS — SYSTOLIC BLOOD PRESSURE: 145 MMHG | DIASTOLIC BLOOD PRESSURE: 84 MMHG

## 2017-05-14 VITALS — SYSTOLIC BLOOD PRESSURE: 122 MMHG | DIASTOLIC BLOOD PRESSURE: 68 MMHG

## 2017-05-14 RX ADMIN — DOCUSATE SODIUM SCH MG: 100 CAPSULE, LIQUID FILLED ORAL at 10:26

## 2017-05-14 RX ADMIN — LISINOPRIL SCH MG: 10 TABLET ORAL at 10:26

## 2017-05-14 RX ADMIN — SODIUM CHLORIDE SCH ML: 9 INJECTION INTRAMUSCULAR; INTRAVENOUS; SUBCUTANEOUS at 14:00

## 2017-05-14 RX ADMIN — HYDROMORPHONE HYDROCHLORIDE PRN MG: 2 INJECTION, SOLUTION INTRAMUSCULAR; INTRAVENOUS; SUBCUTANEOUS at 03:43

## 2017-05-14 RX ADMIN — HYDROMORPHONE HYDROCHLORIDE PRN MG: 2 INJECTION, SOLUTION INTRAMUSCULAR; INTRAVENOUS; SUBCUTANEOUS at 08:23

## 2017-05-14 RX ADMIN — SODIUM CHLORIDE SCH ML: 9 INJECTION INTRAMUSCULAR; INTRAVENOUS; SUBCUTANEOUS at 23:18

## 2017-05-14 RX ADMIN — TEMAZEPAM PRN MG: 15 CAPSULE ORAL at 11:45

## 2017-05-14 RX ADMIN — HYDROMORPHONE HYDROCHLORIDE PRN MG: 2 INJECTION, SOLUTION INTRAMUSCULAR; INTRAVENOUS; SUBCUTANEOUS at 16:25

## 2017-05-14 RX ADMIN — ENOXAPARIN SODIUM SCH MG: 40 INJECTION SUBCUTANEOUS at 10:27

## 2017-05-14 RX ADMIN — DOCUSATE SODIUM SCH MG: 100 CAPSULE, LIQUID FILLED ORAL at 22:39

## 2017-05-14 RX ADMIN — HYDROMORPHONE HYDROCHLORIDE PRN MG: 2 INJECTION, SOLUTION INTRAMUSCULAR; INTRAVENOUS; SUBCUTANEOUS at 11:46

## 2017-05-14 RX ADMIN — SODIUM CHLORIDE SCH ML: 9 INJECTION INTRAMUSCULAR; INTRAVENOUS; SUBCUTANEOUS at 07:43

## 2017-05-15 VITALS — SYSTOLIC BLOOD PRESSURE: 104 MMHG | DIASTOLIC BLOOD PRESSURE: 68 MMHG

## 2017-05-15 VITALS — DIASTOLIC BLOOD PRESSURE: 78 MMHG | SYSTOLIC BLOOD PRESSURE: 140 MMHG

## 2017-05-15 VITALS — DIASTOLIC BLOOD PRESSURE: 75 MMHG | SYSTOLIC BLOOD PRESSURE: 138 MMHG

## 2017-05-15 VITALS — SYSTOLIC BLOOD PRESSURE: 144 MMHG | DIASTOLIC BLOOD PRESSURE: 84 MMHG

## 2017-05-15 VITALS — SYSTOLIC BLOOD PRESSURE: 137 MMHG | DIASTOLIC BLOOD PRESSURE: 68 MMHG

## 2017-05-15 VITALS — DIASTOLIC BLOOD PRESSURE: 79 MMHG | SYSTOLIC BLOOD PRESSURE: 157 MMHG

## 2017-05-15 LAB
HCT VFR BLD AUTO: 29.8 % (ref 36–46)
HGB BLD-MCNC: 9.9 G/DL (ref 12.2–16.2)
MCH RBC QN AUTO: 30.1 PG (ref 28–32)
MCV RBC AUTO: 90.1 FL (ref 80–100)
NRBC BLD QL AUTO: 0 %

## 2017-05-15 RX ADMIN — SODIUM CHLORIDE SCH ML: 9 INJECTION INTRAMUSCULAR; INTRAVENOUS; SUBCUTANEOUS at 06:55

## 2017-05-15 RX ADMIN — ENOXAPARIN SODIUM SCH MG: 40 INJECTION SUBCUTANEOUS at 10:13

## 2017-05-15 RX ADMIN — DOCUSATE SODIUM SCH MG: 100 CAPSULE, LIQUID FILLED ORAL at 10:12

## 2017-05-15 RX ADMIN — TEMAZEPAM PRN MG: 15 CAPSULE ORAL at 21:17

## 2017-05-15 RX ADMIN — SODIUM CHLORIDE SCH ML: 9 INJECTION INTRAMUSCULAR; INTRAVENOUS; SUBCUTANEOUS at 14:00

## 2017-05-15 RX ADMIN — HYDROCODONE BITARTRATE AND ACETAMINOPHEN PRN TAB: 10; 325 TABLET ORAL at 06:55

## 2017-05-15 RX ADMIN — LISINOPRIL SCH MG: 10 TABLET ORAL at 10:13

## 2017-05-15 RX ADMIN — DOCUSATE SODIUM SCH MG: 100 CAPSULE, LIQUID FILLED ORAL at 21:17

## 2017-05-15 RX ADMIN — HYDROMORPHONE HYDROCHLORIDE PRN MG: 2 INJECTION, SOLUTION INTRAMUSCULAR; INTRAVENOUS; SUBCUTANEOUS at 09:45

## 2017-05-15 RX ADMIN — SODIUM CHLORIDE SCH ML: 9 INJECTION INTRAMUSCULAR; INTRAVENOUS; SUBCUTANEOUS at 21:17

## 2017-05-16 VITALS — SYSTOLIC BLOOD PRESSURE: 153 MMHG | DIASTOLIC BLOOD PRESSURE: 86 MMHG

## 2017-05-16 VITALS — DIASTOLIC BLOOD PRESSURE: 75 MMHG | SYSTOLIC BLOOD PRESSURE: 135 MMHG

## 2017-05-16 VITALS — SYSTOLIC BLOOD PRESSURE: 130 MMHG | DIASTOLIC BLOOD PRESSURE: 70 MMHG

## 2017-05-16 VITALS — DIASTOLIC BLOOD PRESSURE: 86 MMHG | SYSTOLIC BLOOD PRESSURE: 153 MMHG

## 2017-05-16 VITALS — SYSTOLIC BLOOD PRESSURE: 133 MMHG | DIASTOLIC BLOOD PRESSURE: 88 MMHG

## 2017-05-16 RX ADMIN — SODIUM CHLORIDE SCH ML: 9 INJECTION INTRAMUSCULAR; INTRAVENOUS; SUBCUTANEOUS at 14:00

## 2017-05-16 RX ADMIN — LISINOPRIL SCH MG: 10 TABLET ORAL at 09:38

## 2017-05-16 RX ADMIN — SODIUM CHLORIDE SCH ML: 9 INJECTION INTRAMUSCULAR; INTRAVENOUS; SUBCUTANEOUS at 06:13

## 2017-05-16 RX ADMIN — DOCUSATE SODIUM SCH MG: 100 CAPSULE, LIQUID FILLED ORAL at 09:37

## 2017-05-16 RX ADMIN — ENOXAPARIN SODIUM SCH MG: 40 INJECTION SUBCUTANEOUS at 09:38

## 2017-05-26 ENCOUNTER — HOSPITAL ENCOUNTER (OUTPATIENT)
Dept: HOSPITAL 15 - LAB | Age: 72
Discharge: HOME | End: 2017-05-26
Attending: INTERNAL MEDICINE
Payer: COMMERCIAL

## 2017-05-26 DIAGNOSIS — R19.7: Primary | ICD-10-CM

## 2017-05-26 LAB
ALBUMIN SERPL-MCNC: 2.8 G/DL (ref 3.4–5)
ALP SERPL-CCNC: 94 U/L (ref 45–117)
ANION GAP SERPL CALCULATED.3IONS-SCNC: 10 MMOL/L (ref 5–15)
BILIRUB SERPL-MCNC: 0.2 MG/DL (ref 0.2–1)
BUN SERPL-MCNC: 16 MG/DL (ref 7–18)
BUN/CREAT SERPL: 12
CALCIUM SERPL-MCNC: 9.5 MG/DL (ref 8.5–10.1)
CHLORIDE SERPL-SCNC: 105 MMOL/L (ref 98–107)
CO2 SERPL-SCNC: 28 MMOL/L (ref 21–32)
DEFINITIVE: (no result)
GLUCOSE SERPL-MCNC: 139 MG/DL (ref 74–106)
HCT VFR BLD AUTO: 35.5 % (ref 36–46)
HGB BLD-MCNC: 11.7 G/DL (ref 12.2–16.2)
MCH RBC QN AUTO: 30 PG (ref 28–32)
MCV RBC AUTO: 90.8 FL (ref 80–100)
NRBC BLD QL AUTO: 0 %
POTASSIUM SERPL-SCNC: 3.6 MMOL/L (ref 3.5–5.1)
PROT SERPL-MCNC: 7.2 G/DL (ref 6.4–8.2)
SODIUM SERPL-SCNC: 143 MMOL/L (ref 136–145)

## 2017-05-26 PROCEDURE — 36415 COLL VENOUS BLD VENIPUNCTURE: CPT

## 2017-05-26 PROCEDURE — 85025 COMPLETE CBC W/AUTO DIFF WBC: CPT

## 2017-05-26 PROCEDURE — 80053 COMPREHEN METABOLIC PANEL: CPT

## 2017-05-30 ENCOUNTER — HOSPITAL ENCOUNTER (OUTPATIENT)
Dept: HOSPITAL 15 - LAB | Age: 72
Discharge: HOME | End: 2017-05-30
Attending: INTERNAL MEDICINE
Payer: COMMERCIAL

## 2017-05-30 DIAGNOSIS — R19.7: ICD-10-CM

## 2017-05-30 DIAGNOSIS — E11.9: Primary | ICD-10-CM

## 2017-05-30 PROCEDURE — 87493 C DIFF AMPLIFIED PROBE: CPT

## 2017-06-05 ENCOUNTER — HOSPITAL ENCOUNTER (OUTPATIENT)
Dept: HOSPITAL 15 - LAB | Age: 72
Discharge: HOME | End: 2017-06-05
Attending: INTERNAL MEDICINE
Payer: COMMERCIAL

## 2017-06-05 DIAGNOSIS — D47.3: Primary | ICD-10-CM

## 2017-06-05 DIAGNOSIS — I10: ICD-10-CM

## 2017-06-05 LAB
HCT VFR BLD AUTO: 35.9 % (ref 36–46)
HGB BLD-MCNC: 11.7 G/DL (ref 12.2–16.2)
MCH RBC QN AUTO: 29.7 PG (ref 28–32)
MCV RBC AUTO: 91.4 FL (ref 80–100)
NRBC BLD QL AUTO: 0 %

## 2017-06-05 PROCEDURE — 85025 COMPLETE CBC W/AUTO DIFF WBC: CPT

## 2017-06-05 PROCEDURE — 83036 HEMOGLOBIN GLYCOSYLATED A1C: CPT

## 2017-06-05 PROCEDURE — 84132 ASSAY OF SERUM POTASSIUM: CPT

## 2017-06-05 PROCEDURE — 36415 COLL VENOUS BLD VENIPUNCTURE: CPT

## 2018-01-09 ENCOUNTER — HOSPITAL ENCOUNTER (OUTPATIENT)
Dept: HOSPITAL 15 - LAB | Age: 73
Discharge: HOME | End: 2018-01-09
Attending: INTERNAL MEDICINE
Payer: COMMERCIAL

## 2018-01-09 DIAGNOSIS — E11.9: Primary | ICD-10-CM

## 2018-01-09 DIAGNOSIS — D64.9: ICD-10-CM

## 2018-01-09 LAB
ALBUMIN SERPL-MCNC: 3.1 G/DL (ref 3.4–5)
ALP SERPL-CCNC: 97 U/L (ref 45–117)
ALT SERPL-CCNC: 26 U/L (ref 13–56)
ANION GAP SERPL CALCULATED.3IONS-SCNC: 6 MMOL/L (ref 5–15)
BILIRUB SERPL-MCNC: 0.3 MG/DL (ref 0.2–1)
BUN SERPL-MCNC: 29 MG/DL (ref 7–18)
BUN/CREAT SERPL: 28.2
CALCIUM SERPL-MCNC: 9.6 MG/DL (ref 8.5–10.1)
CHLORIDE SERPL-SCNC: 105 MMOL/L (ref 98–107)
CO2 SERPL-SCNC: 31 MMOL/L (ref 21–32)
GLUCOSE SERPL-MCNC: 105 MG/DL (ref 74–106)
HCT VFR BLD AUTO: 39.5 % (ref 36–46)
HGB BLD-MCNC: 12.7 G/DL (ref 12.2–16.2)
MCH RBC QN AUTO: 29.6 PG (ref 28–32)
MCV RBC AUTO: 92 FL (ref 80–100)
NRBC BLD QL AUTO: 0 %
POTASSIUM SERPL-SCNC: 4 MMOL/L (ref 3.5–5.1)
PROT SERPL-MCNC: 7.3 G/DL (ref 6.4–8.2)
SODIUM SERPL-SCNC: 142 MMOL/L (ref 136–145)
URATE SERPL-MCNC: 5.8 MG/DL (ref 2.6–6)

## 2018-01-09 PROCEDURE — 83970 ASSAY OF PARATHORMONE: CPT

## 2018-01-09 PROCEDURE — 80053 COMPREHEN METABOLIC PANEL: CPT

## 2018-01-09 PROCEDURE — 85025 COMPLETE CBC W/AUTO DIFF WBC: CPT

## 2018-01-09 PROCEDURE — 36415 COLL VENOUS BLD VENIPUNCTURE: CPT

## 2018-01-09 PROCEDURE — 84550 ASSAY OF BLOOD/URIC ACID: CPT

## 2018-01-09 PROCEDURE — 83036 HEMOGLOBIN GLYCOSYLATED A1C: CPT

## 2018-07-03 ENCOUNTER — HOSPITAL ENCOUNTER (OUTPATIENT)
Dept: HOSPITAL 15 - LAB | Age: 73
Discharge: HOME | End: 2018-07-03
Attending: INTERNAL MEDICINE
Payer: COMMERCIAL

## 2018-07-03 DIAGNOSIS — E78.5: ICD-10-CM

## 2018-07-03 DIAGNOSIS — I12.9: Primary | ICD-10-CM

## 2018-07-03 DIAGNOSIS — Z79.899: ICD-10-CM

## 2018-07-03 DIAGNOSIS — N18.3: ICD-10-CM

## 2018-07-03 DIAGNOSIS — E78.00: ICD-10-CM

## 2018-07-03 DIAGNOSIS — E11.22: ICD-10-CM

## 2018-07-03 LAB
ALBUMIN SERPL-MCNC: 3.4 G/DL (ref 3.4–5)
ALP SERPL-CCNC: 92 U/L (ref 45–117)
ALT SERPL-CCNC: 25 U/L (ref 13–56)
ANION GAP SERPL CALCULATED.3IONS-SCNC: 5 MMOL/L (ref 5–15)
BILIRUB SERPL-MCNC: 0.4 MG/DL (ref 0.2–1)
BUN SERPL-MCNC: 25 MG/DL (ref 7–18)
BUN/CREAT SERPL: 24.8
CALCIUM SERPL-MCNC: 9.8 MG/DL (ref 8.5–10.1)
CHLORIDE SERPL-SCNC: 106 MMOL/L (ref 98–107)
CHOLEST SERPL-MCNC: 157 MG/DL (ref ?–200)
CO2 SERPL-SCNC: 31 MMOL/L (ref 21–32)
GLUCOSE SERPL-MCNC: 108 MG/DL (ref 74–106)
HCT VFR BLD AUTO: 40.1 % (ref 36–46)
HDLC SERPL-MCNC: 54 MG/DL (ref 40–59)
HGB BLD-MCNC: 13.1 G/DL (ref 12.2–16.2)
MCH RBC QN AUTO: 29.9 PG (ref 28–32)
MCV RBC AUTO: 91.5 FL (ref 80–100)
NRBC BLD QL AUTO: 0.1 %
POTASSIUM SERPL-SCNC: 3.8 MMOL/L (ref 3.5–5.1)
PROT SERPL-MCNC: 7 G/DL (ref 6.4–8.2)
SODIUM SERPL-SCNC: 142 MMOL/L (ref 136–145)
T4 FREE SERPL-MCNC: 1.1 NG/DL (ref 0.89–1.76)
TRIGL SERPL-MCNC: 118 MG/DL (ref ?–150)
URATE SERPL-MCNC: 5.7 MG/DL (ref 2.6–6)

## 2018-07-03 PROCEDURE — 80061 LIPID PANEL: CPT

## 2018-07-03 PROCEDURE — 81001 URINALYSIS AUTO W/SCOPE: CPT

## 2018-07-03 PROCEDURE — 36415 COLL VENOUS BLD VENIPUNCTURE: CPT

## 2018-07-03 PROCEDURE — 84439 ASSAY OF FREE THYROXINE: CPT

## 2018-07-03 PROCEDURE — 85025 COMPLETE CBC W/AUTO DIFF WBC: CPT

## 2018-07-03 PROCEDURE — 80053 COMPREHEN METABOLIC PANEL: CPT

## 2018-07-03 PROCEDURE — 84443 ASSAY THYROID STIM HORMONE: CPT

## 2018-07-03 PROCEDURE — 83970 ASSAY OF PARATHORMONE: CPT

## 2018-07-03 PROCEDURE — 82607 VITAMIN B-12: CPT

## 2018-07-03 PROCEDURE — 85652 RBC SED RATE AUTOMATED: CPT

## 2018-07-03 PROCEDURE — 82043 UR ALBUMIN QUANTITATIVE: CPT

## 2018-07-03 PROCEDURE — 84550 ASSAY OF BLOOD/URIC ACID: CPT

## 2018-07-03 PROCEDURE — 83036 HEMOGLOBIN GLYCOSYLATED A1C: CPT

## 2019-01-24 ENCOUNTER — HOSPITAL ENCOUNTER (OUTPATIENT)
Dept: HOSPITAL 15 - LAB | Age: 74
Discharge: HOME | End: 2019-01-24
Attending: INTERNAL MEDICINE
Payer: COMMERCIAL

## 2019-01-24 DIAGNOSIS — I12.9: Primary | ICD-10-CM

## 2019-01-24 DIAGNOSIS — N18.3: ICD-10-CM

## 2019-01-24 LAB
ALBUMIN SERPL-MCNC: 3.3 G/DL (ref 3.4–5)
ALP SERPL-CCNC: 97 U/L (ref 45–117)
ALT SERPL-CCNC: 21 U/L (ref 13–56)
ANION GAP SERPL CALCULATED.3IONS-SCNC: 2 MMOL/L (ref 5–15)
BILIRUB SERPL-MCNC: 0.4 MG/DL (ref 0.2–1)
BUN SERPL-MCNC: 42 MG/DL (ref 7–18)
BUN/CREAT SERPL: 32.3
CALCIUM SERPL-MCNC: 9.5 MG/DL (ref 8.5–10.1)
CHLORIDE SERPL-SCNC: 110 MMOL/L (ref 98–107)
CO2 SERPL-SCNC: 29 MMOL/L (ref 21–32)
GLUCOSE SERPL-MCNC: 110 MG/DL (ref 74–106)
HCT VFR BLD AUTO: 42.8 % (ref 36–46)
HGB BLD-MCNC: 14.2 G/DL (ref 12.2–16.2)
MCH RBC QN AUTO: 30.7 PG (ref 28–32)
MCV RBC AUTO: 92.1 FL (ref 80–100)
NRBC BLD QL AUTO: 0.1 %
POTASSIUM SERPL-SCNC: 4.2 MMOL/L (ref 3.5–5.1)
PROT SERPL-MCNC: 7.1 G/DL (ref 6.4–8.2)
SODIUM SERPL-SCNC: 141 MMOL/L (ref 136–145)
URATE SERPL-MCNC: 6.4 MG/DL (ref 2.6–6)

## 2019-01-24 PROCEDURE — 80053 COMPREHEN METABOLIC PANEL: CPT

## 2019-01-24 PROCEDURE — 83970 ASSAY OF PARATHORMONE: CPT

## 2019-01-24 PROCEDURE — 83036 HEMOGLOBIN GLYCOSYLATED A1C: CPT

## 2019-01-24 PROCEDURE — 85025 COMPLETE CBC W/AUTO DIFF WBC: CPT

## 2019-01-24 PROCEDURE — 84550 ASSAY OF BLOOD/URIC ACID: CPT

## 2019-01-24 PROCEDURE — 36415 COLL VENOUS BLD VENIPUNCTURE: CPT

## 2019-03-15 ENCOUNTER — HOSPITAL ENCOUNTER (OUTPATIENT)
Dept: HOSPITAL 15 - LAB | Age: 74
Discharge: HOME | End: 2019-03-15
Attending: INTERNAL MEDICINE
Payer: COMMERCIAL

## 2019-03-15 DIAGNOSIS — M25.50: Primary | ICD-10-CM

## 2019-03-15 DIAGNOSIS — G31.84: ICD-10-CM

## 2019-03-15 LAB
ALBUMIN SERPL-MCNC: 3.3 G/DL (ref 3.4–5)
ALP SERPL-CCNC: 99 U/L (ref 45–117)
ALT SERPL-CCNC: 21 U/L (ref 13–56)
ANION GAP SERPL CALCULATED.3IONS-SCNC: 6 MMOL/L (ref 5–15)
BILIRUB SERPL-MCNC: 0.4 MG/DL (ref 0.2–1)
BUN SERPL-MCNC: 37 MG/DL (ref 7–18)
BUN/CREAT SERPL: 27.8
CALCIUM SERPL-MCNC: 9.5 MG/DL (ref 8.5–10.1)
CHLORIDE SERPL-SCNC: 104 MMOL/L (ref 98–107)
CO2 SERPL-SCNC: 31 MMOL/L (ref 21–32)
GLUCOSE SERPL-MCNC: 152 MG/DL (ref 74–106)
HCT VFR BLD AUTO: 43 % (ref 36–46)
HGB BLD-MCNC: 13.9 G/DL (ref 12.2–16.2)
MCH RBC QN AUTO: 29.9 PG (ref 28–32)
MCV RBC AUTO: 92.5 FL (ref 80–100)
NRBC BLD QL AUTO: 0.1 %
POTASSIUM SERPL-SCNC: 3.1 MMOL/L (ref 3.5–5.1)
PROT SERPL-MCNC: 7.3 G/DL (ref 6.4–8.2)
SODIUM SERPL-SCNC: 141 MMOL/L (ref 136–145)

## 2019-03-15 PROCEDURE — 86431 RHEUMATOID FACTOR QUANT: CPT

## 2019-03-15 PROCEDURE — 84443 ASSAY THYROID STIM HORMONE: CPT

## 2019-03-15 PROCEDURE — 85025 COMPLETE CBC W/AUTO DIFF WBC: CPT

## 2019-03-15 PROCEDURE — 36415 COLL VENOUS BLD VENIPUNCTURE: CPT

## 2019-03-15 PROCEDURE — 85652 RBC SED RATE AUTOMATED: CPT

## 2019-03-15 PROCEDURE — 81001 URINALYSIS AUTO W/SCOPE: CPT

## 2019-03-15 PROCEDURE — 86200 CCP ANTIBODY: CPT

## 2019-03-15 PROCEDURE — 80053 COMPREHEN METABOLIC PANEL: CPT

## 2019-03-15 PROCEDURE — 82607 VITAMIN B-12: CPT

## 2019-08-09 ENCOUNTER — HOSPITAL ENCOUNTER (OUTPATIENT)
Dept: HOSPITAL 15 - LAB | Age: 74
Discharge: HOME | End: 2019-08-09
Attending: INTERNAL MEDICINE
Payer: COMMERCIAL

## 2019-08-09 DIAGNOSIS — R59.0: Primary | ICD-10-CM

## 2019-08-09 LAB
ALBUMIN SERPL-MCNC: 3.3 G/DL (ref 3.4–5)
ALP SERPL-CCNC: 94 U/L (ref 45–117)
ALT SERPL-CCNC: 18 U/L (ref 13–56)
ANION GAP SERPL CALCULATED.3IONS-SCNC: 5 MMOL/L (ref 5–15)
BILIRUB SERPL-MCNC: 0.3 MG/DL (ref 0.2–1)
BUN SERPL-MCNC: 33 MG/DL (ref 7–18)
BUN/CREAT SERPL: 25.2
CALCIUM SERPL-MCNC: 10.1 MG/DL (ref 8.5–10.1)
CHLORIDE SERPL-SCNC: 110 MMOL/L (ref 98–107)
CO2 SERPL-SCNC: 29 MMOL/L (ref 21–32)
GLUCOSE SERPL-MCNC: 115 MG/DL (ref 74–106)
HCT VFR BLD AUTO: 39.9 % (ref 36–46)
HGB BLD-MCNC: 13.1 G/DL (ref 12.2–16.2)
MCH RBC QN AUTO: 30.2 PG (ref 28–32)
MCV RBC AUTO: 91.8 FL (ref 80–100)
NRBC BLD QL AUTO: 0.1 %
POTASSIUM SERPL-SCNC: 4 MMOL/L (ref 3.5–5.1)
PROT SERPL-MCNC: 7.4 G/DL (ref 6.4–8.2)
SODIUM SERPL-SCNC: 144 MMOL/L (ref 136–145)

## 2019-08-09 PROCEDURE — 85025 COMPLETE CBC W/AUTO DIFF WBC: CPT

## 2019-08-09 PROCEDURE — 80053 COMPREHEN METABOLIC PANEL: CPT

## 2019-08-09 PROCEDURE — 36415 COLL VENOUS BLD VENIPUNCTURE: CPT

## 2019-08-09 PROCEDURE — 83615 LACTATE (LD) (LDH) ENZYME: CPT

## 2019-09-17 ENCOUNTER — HOSPITAL ENCOUNTER (OUTPATIENT)
Dept: HOSPITAL 15 - LAB | Age: 74
Discharge: HOME | End: 2019-09-17
Attending: INTERNAL MEDICINE
Payer: COMMERCIAL

## 2019-09-17 DIAGNOSIS — I10: ICD-10-CM

## 2019-09-17 DIAGNOSIS — Z01.818: Primary | ICD-10-CM

## 2019-09-17 LAB
APTT PPP: 26.1 SEC (ref 23.64–32.05)
HCT VFR BLD AUTO: 39.2 % (ref 36–46)
HGB BLD-MCNC: 12.7 G/DL (ref 12.2–16.2)
INR PPP: < 0.93 (ref 0.9–1.15)
MCH RBC QN AUTO: 30 PG (ref 28–32)
MCV RBC AUTO: 92.9 FL (ref 80–100)
NRBC BLD QL AUTO: 0 %

## 2019-09-17 PROCEDURE — 85730 THROMBOPLASTIN TIME PARTIAL: CPT

## 2019-09-17 PROCEDURE — 85025 COMPLETE CBC W/AUTO DIFF WBC: CPT

## 2019-09-17 PROCEDURE — 36415 COLL VENOUS BLD VENIPUNCTURE: CPT

## 2019-09-17 PROCEDURE — 85610 PROTHROMBIN TIME: CPT

## 2019-09-18 ENCOUNTER — HOSPITAL ENCOUNTER (OUTPATIENT)
Dept: HOSPITAL 15 - CT | Age: 74
Discharge: HOME | End: 2019-09-18
Attending: INTERNAL MEDICINE
Payer: COMMERCIAL

## 2019-09-18 VITALS — BODY MASS INDEX: 0.23 KG/M2 | WEIGHT: 1 LBS | HEIGHT: 55 IN

## 2019-09-18 DIAGNOSIS — Z87.891: ICD-10-CM

## 2019-09-18 DIAGNOSIS — Z79.899: ICD-10-CM

## 2019-09-18 DIAGNOSIS — Z85.41: ICD-10-CM

## 2019-09-18 DIAGNOSIS — D49.89: Primary | ICD-10-CM

## 2019-09-18 DIAGNOSIS — Z90.49: ICD-10-CM

## 2019-09-18 PROCEDURE — 38505 NEEDLE BIOPSY LYMPH NODES: CPT

## 2019-09-18 PROCEDURE — 99153 MOD SED SAME PHYS/QHP EA: CPT

## 2019-09-18 PROCEDURE — 49180 BIOPSY ABDOMINAL MASS: CPT

## 2019-09-18 PROCEDURE — 10022: CPT

## 2019-09-18 PROCEDURE — 77012 CT SCAN FOR NEEDLE BIOPSY: CPT

## 2019-09-18 PROCEDURE — 88305 TISSUE EXAM BY PATHOLOGIST: CPT

## 2019-09-18 PROCEDURE — 99152 MOD SED SAME PHYS/QHP 5/>YRS: CPT

## 2019-09-18 PROCEDURE — 74170 CT ABD WO CNTRST FLWD CNTRST: CPT

## 2019-09-18 PROCEDURE — 88342 IMHCHEM/IMCYTCHM 1ST ANTB: CPT

## 2019-09-26 ENCOUNTER — HOSPITAL ENCOUNTER (INPATIENT)
Dept: HOSPITAL 15 - ER | Age: 74
LOS: 7 days | DRG: 853 | End: 2019-10-03
Attending: INTERNAL MEDICINE | Admitting: INTERNAL MEDICINE
Payer: COMMERCIAL

## 2019-09-26 VITALS — DIASTOLIC BLOOD PRESSURE: 69 MMHG | SYSTOLIC BLOOD PRESSURE: 120 MMHG

## 2019-09-26 VITALS — DIASTOLIC BLOOD PRESSURE: 62 MMHG | SYSTOLIC BLOOD PRESSURE: 94 MMHG

## 2019-09-26 VITALS — DIASTOLIC BLOOD PRESSURE: 64 MMHG | SYSTOLIC BLOOD PRESSURE: 94 MMHG

## 2019-09-26 VITALS — SYSTOLIC BLOOD PRESSURE: 120 MMHG | DIASTOLIC BLOOD PRESSURE: 69 MMHG

## 2019-09-26 VITALS — BODY MASS INDEX: 31.89 KG/M2 | WEIGHT: 198.42 LBS | HEIGHT: 66 IN

## 2019-09-26 VITALS — SYSTOLIC BLOOD PRESSURE: 133 MMHG | DIASTOLIC BLOOD PRESSURE: 69 MMHG

## 2019-09-26 VITALS — SYSTOLIC BLOOD PRESSURE: 94 MMHG | DIASTOLIC BLOOD PRESSURE: 68 MMHG

## 2019-09-26 VITALS — DIASTOLIC BLOOD PRESSURE: 59 MMHG | SYSTOLIC BLOOD PRESSURE: 119 MMHG

## 2019-09-26 VITALS — DIASTOLIC BLOOD PRESSURE: 79 MMHG | SYSTOLIC BLOOD PRESSURE: 106 MMHG

## 2019-09-26 VITALS — DIASTOLIC BLOOD PRESSURE: 79 MMHG | SYSTOLIC BLOOD PRESSURE: 159 MMHG

## 2019-09-26 VITALS — SYSTOLIC BLOOD PRESSURE: 96 MMHG | DIASTOLIC BLOOD PRESSURE: 65 MMHG

## 2019-09-26 VITALS — DIASTOLIC BLOOD PRESSURE: 66 MMHG | SYSTOLIC BLOOD PRESSURE: 96 MMHG

## 2019-09-26 VITALS — DIASTOLIC BLOOD PRESSURE: 65 MMHG | SYSTOLIC BLOOD PRESSURE: 116 MMHG

## 2019-09-26 VITALS — DIASTOLIC BLOOD PRESSURE: 68 MMHG | SYSTOLIC BLOOD PRESSURE: 100 MMHG

## 2019-09-26 VITALS — DIASTOLIC BLOOD PRESSURE: 39 MMHG | SYSTOLIC BLOOD PRESSURE: 108 MMHG

## 2019-09-26 VITALS — SYSTOLIC BLOOD PRESSURE: 108 MMHG | DIASTOLIC BLOOD PRESSURE: 39 MMHG

## 2019-09-26 VITALS — DIASTOLIC BLOOD PRESSURE: 70 MMHG | SYSTOLIC BLOOD PRESSURE: 99 MMHG

## 2019-09-26 VITALS — SYSTOLIC BLOOD PRESSURE: 119 MMHG | DIASTOLIC BLOOD PRESSURE: 59 MMHG

## 2019-09-26 VITALS — DIASTOLIC BLOOD PRESSURE: 65 MMHG | SYSTOLIC BLOOD PRESSURE: 96 MMHG

## 2019-09-26 VITALS — SYSTOLIC BLOOD PRESSURE: 106 MMHG | DIASTOLIC BLOOD PRESSURE: 79 MMHG

## 2019-09-26 VITALS — DIASTOLIC BLOOD PRESSURE: 65 MMHG | SYSTOLIC BLOOD PRESSURE: 100 MMHG

## 2019-09-26 VITALS — SYSTOLIC BLOOD PRESSURE: 115 MMHG | DIASTOLIC BLOOD PRESSURE: 61 MMHG

## 2019-09-26 VITALS — SYSTOLIC BLOOD PRESSURE: 109 MMHG | DIASTOLIC BLOOD PRESSURE: 83 MMHG

## 2019-09-26 VITALS — SYSTOLIC BLOOD PRESSURE: 107 MMHG | DIASTOLIC BLOOD PRESSURE: 61 MMHG

## 2019-09-26 DIAGNOSIS — Z85.41: ICD-10-CM

## 2019-09-26 DIAGNOSIS — E87.6: ICD-10-CM

## 2019-09-26 DIAGNOSIS — R65.21: ICD-10-CM

## 2019-09-26 DIAGNOSIS — I51.81: ICD-10-CM

## 2019-09-26 DIAGNOSIS — A41.9: Primary | ICD-10-CM

## 2019-09-26 DIAGNOSIS — J18.9: ICD-10-CM

## 2019-09-26 DIAGNOSIS — J98.11: ICD-10-CM

## 2019-09-26 DIAGNOSIS — N18.3: ICD-10-CM

## 2019-09-26 DIAGNOSIS — G93.1: ICD-10-CM

## 2019-09-26 DIAGNOSIS — Z90.710: ICD-10-CM

## 2019-09-26 DIAGNOSIS — E11.22: ICD-10-CM

## 2019-09-26 DIAGNOSIS — E11.649: ICD-10-CM

## 2019-09-26 DIAGNOSIS — I42.9: ICD-10-CM

## 2019-09-26 DIAGNOSIS — N17.0: ICD-10-CM

## 2019-09-26 DIAGNOSIS — E87.5: ICD-10-CM

## 2019-09-26 DIAGNOSIS — I21.4: ICD-10-CM

## 2019-09-26 DIAGNOSIS — R57.9: ICD-10-CM

## 2019-09-26 DIAGNOSIS — I13.0: ICD-10-CM

## 2019-09-26 DIAGNOSIS — E78.5: ICD-10-CM

## 2019-09-26 DIAGNOSIS — I51.3: ICD-10-CM

## 2019-09-26 DIAGNOSIS — K72.00: ICD-10-CM

## 2019-09-26 DIAGNOSIS — I48.91: ICD-10-CM

## 2019-09-26 DIAGNOSIS — L89.151: ICD-10-CM

## 2019-09-26 DIAGNOSIS — R57.0: ICD-10-CM

## 2019-09-26 DIAGNOSIS — J96.01: ICD-10-CM

## 2019-09-26 DIAGNOSIS — D44.7: ICD-10-CM

## 2019-09-26 DIAGNOSIS — I50.43: ICD-10-CM

## 2019-09-26 DIAGNOSIS — E87.4: ICD-10-CM

## 2019-09-26 LAB
ALBUMIN SERPL-MCNC: 3.3 G/DL (ref 3.4–5)
ALP SERPL-CCNC: 97 U/L (ref 45–117)
ALT SERPL-CCNC: 22 U/L (ref 13–56)
ANION GAP SERPL CALCULATED.3IONS-SCNC: 12 MMOL/L (ref 5–15)
APTT PPP: 21.8 SEC (ref 23.64–32.05)
BILIRUB SERPL-MCNC: 0.4 MG/DL (ref 0.2–1)
BUN SERPL-MCNC: 31 MG/DL (ref 7–18)
BUN/CREAT SERPL: 21.2
CALCIUM SERPL-MCNC: 9.4 MG/DL (ref 8.5–10.1)
CHLORIDE SERPL-SCNC: 110 MMOL/L (ref 98–107)
CO2 SERPL-SCNC: 21 MMOL/L (ref 21–32)
GLUCOSE SERPL-MCNC: 306 MG/DL (ref 74–106)
HCT VFR BLD AUTO: 41.2 % (ref 36–46)
HGB BLD-MCNC: 13.2 G/DL (ref 12.2–16.2)
INR PPP: < 0.93 (ref 0.9–1.15)
LACTATE PLASV-SCNC: 7.9 MMOL/L (ref 0.4–2)
MAGNESIUM SERPL-MCNC: 2.4 MG/DL (ref 1.6–2.6)
MCH RBC QN AUTO: 29.9 PG (ref 28–32)
MCV RBC AUTO: 93.6 FL (ref 80–100)
NRBC BLD QL AUTO: 0 %
POTASSIUM SERPL-SCNC: 3.2 MMOL/L (ref 3.5–5.1)
PROT SERPL-MCNC: 7.2 G/DL (ref 6.4–8.2)
SODIUM SERPL-SCNC: 143 MMOL/L (ref 136–145)

## 2019-09-26 PROCEDURE — 84132 ASSAY OF SERUM POTASSIUM: CPT

## 2019-09-26 PROCEDURE — 83036 HEMOGLOBIN GLYCOSYLATED A1C: CPT

## 2019-09-26 PROCEDURE — 85610 PROTHROMBIN TIME: CPT

## 2019-09-26 PROCEDURE — 83835 ASSAY OF METANEPHRINES: CPT

## 2019-09-26 PROCEDURE — 31500 INSERT EMERGENCY AIRWAY: CPT

## 2019-09-26 PROCEDURE — 71250 CT THORAX DX C-: CPT

## 2019-09-26 PROCEDURE — 96375 TX/PRO/DX INJ NEW DRUG ADDON: CPT

## 2019-09-26 PROCEDURE — 83735 ASSAY OF MAGNESIUM: CPT

## 2019-09-26 PROCEDURE — 93886 INTRACRANIAL COMPLETE STUDY: CPT

## 2019-09-26 PROCEDURE — 96361 HYDRATE IV INFUSION ADD-ON: CPT

## 2019-09-26 PROCEDURE — 82728 ASSAY OF FERRITIN: CPT

## 2019-09-26 PROCEDURE — 5A1955Z RESPIRATORY VENTILATION, GREATER THAN 96 CONSECUTIVE HOURS: ICD-10-PCS | Performed by: INTERNAL MEDICINE

## 2019-09-26 PROCEDURE — 99291 CRITICAL CARE FIRST HOUR: CPT

## 2019-09-26 PROCEDURE — 74176 CT ABD & PELVIS W/O CONTRAST: CPT

## 2019-09-26 PROCEDURE — 99152 MOD SED SAME PHYS/QHP 5/>YRS: CPT

## 2019-09-26 PROCEDURE — 82607 VITAMIN B-12: CPT

## 2019-09-26 PROCEDURE — 94002 VENT MGMT INPAT INIT DAY: CPT

## 2019-09-26 PROCEDURE — 83935 ASSAY OF URINE OSMOLALITY: CPT

## 2019-09-26 PROCEDURE — 76775 US EXAM ABDO BACK WALL LIM: CPT

## 2019-09-26 PROCEDURE — 84443 ASSAY THYROID STIM HORMONE: CPT

## 2019-09-26 PROCEDURE — 85025 COMPLETE CBC W/AUTO DIFF WBC: CPT

## 2019-09-26 PROCEDURE — 83970 ASSAY OF PARATHORMONE: CPT

## 2019-09-26 PROCEDURE — 82805 BLOOD GASES W/O2 SATURATION: CPT

## 2019-09-26 PROCEDURE — 71045 X-RAY EXAM CHEST 1 VIEW: CPT

## 2019-09-26 PROCEDURE — 36415 COLL VENOUS BLD VENIPUNCTURE: CPT

## 2019-09-26 PROCEDURE — 94761 N-INVAS EAR/PLS OXIMETRY MLT: CPT

## 2019-09-26 PROCEDURE — 82962 GLUCOSE BLOOD TEST: CPT

## 2019-09-26 PROCEDURE — 4A023N7 MEASUREMENT OF CARDIAC SAMPLING AND PRESSURE, LEFT HEART, PERCUTANEOUS APPROACH: ICD-10-PCS | Performed by: INTERNAL MEDICINE

## 2019-09-26 PROCEDURE — B2151ZZ FLUOROSCOPY OF LEFT HEART USING LOW OSMOLAR CONTRAST: ICD-10-PCS | Performed by: INTERNAL MEDICINE

## 2019-09-26 PROCEDURE — 96374 THER/PROPH/DIAG INJ IV PUSH: CPT

## 2019-09-26 PROCEDURE — 82570 ASSAY OF URINE CREATININE: CPT

## 2019-09-26 PROCEDURE — 76536 US EXAM OF HEAD AND NECK: CPT

## 2019-09-26 PROCEDURE — 84484 ASSAY OF TROPONIN QUANT: CPT

## 2019-09-26 PROCEDURE — 93306 TTE W/DOPPLER COMPLETE: CPT

## 2019-09-26 PROCEDURE — 80053 COMPREHEN METABOLIC PANEL: CPT

## 2019-09-26 PROCEDURE — B2111ZZ FLUOROSCOPY OF MULTIPLE CORONARY ARTERIES USING LOW OSMOLAR CONTRAST: ICD-10-PCS | Performed by: INTERNAL MEDICINE

## 2019-09-26 PROCEDURE — 87081 CULTURE SCREEN ONLY: CPT

## 2019-09-26 PROCEDURE — 84550 ASSAY OF BLOOD/URIC ACID: CPT

## 2019-09-26 PROCEDURE — 87205 SMEAR GRAM STAIN: CPT

## 2019-09-26 PROCEDURE — 83540 ASSAY OF IRON: CPT

## 2019-09-26 PROCEDURE — 94003 VENT MGMT INPAT SUBQ DAY: CPT

## 2019-09-26 PROCEDURE — 5A02210 ASSISTANCE WITH CARDIAC OUTPUT USING BALLOON PUMP, CONTINUOUS: ICD-10-PCS | Performed by: INTERNAL MEDICINE

## 2019-09-26 PROCEDURE — 87040 BLOOD CULTURE FOR BACTERIA: CPT

## 2019-09-26 PROCEDURE — 82306 VITAMIN D 25 HYDROXY: CPT

## 2019-09-26 PROCEDURE — 83605 ASSAY OF LACTIC ACID: CPT

## 2019-09-26 PROCEDURE — 84100 ASSAY OF PHOSPHORUS: CPT

## 2019-09-26 PROCEDURE — 87070 CULTURE OTHR SPECIMN AEROBIC: CPT

## 2019-09-26 PROCEDURE — 83550 IRON BINDING TEST: CPT

## 2019-09-26 PROCEDURE — 81001 URINALYSIS AUTO W/SCOPE: CPT

## 2019-09-26 PROCEDURE — 93005 ELECTROCARDIOGRAM TRACING: CPT

## 2019-09-26 PROCEDURE — 84300 ASSAY OF URINE SODIUM: CPT

## 2019-09-26 PROCEDURE — 93458 L HRT ARTERY/VENTRICLE ANGIO: CPT

## 2019-09-26 PROCEDURE — 36600 WITHDRAWAL OF ARTERIAL BLOOD: CPT

## 2019-09-26 PROCEDURE — 82746 ASSAY OF FOLIC ACID SERUM: CPT

## 2019-09-26 PROCEDURE — 99153 MOD SED SAME PHYS/QHP EA: CPT

## 2019-09-26 PROCEDURE — 0BH17EZ INSERTION OF ENDOTRACHEAL AIRWAY INTO TRACHEA, VIA NATURAL OR ARTIFICIAL OPENING: ICD-10-PCS | Performed by: INTERNAL MEDICINE

## 2019-09-26 PROCEDURE — 83880 ASSAY OF NATRIURETIC PEPTIDE: CPT

## 2019-09-26 PROCEDURE — 36556 INSERT NON-TUNNEL CV CATH: CPT

## 2019-09-26 PROCEDURE — 33967 INSERT I-AORT PERCUT DEVICE: CPT

## 2019-09-26 PROCEDURE — 85730 THROMBOPLASTIN TIME PARTIAL: CPT

## 2019-09-26 RX ADMIN — MIDAZOLAM SCH MLS/HR: 5 INJECTION, SOLUTION INTRAMUSCULAR; INTRAVENOUS at 22:48

## 2019-09-26 RX ADMIN — FENTANYL CITRATE SCH MLS/HR: 50 INJECTION, SOLUTION INTRAMUSCULAR; INTRAVENOUS at 12:28

## 2019-09-26 RX ADMIN — MIDAZOLAM SCH MLS/HR: 5 INJECTION, SOLUTION INTRAMUSCULAR; INTRAVENOUS at 09:30

## 2019-09-26 RX ADMIN — NOREPINEPHRINE BITARTRATE SCH MLS/HR: 1 INJECTION, SOLUTION, CONCENTRATE INTRAVENOUS at 21:11

## 2019-09-26 RX ADMIN — FENTANYL CITRATE SCH MLS/HR: 50 INJECTION, SOLUTION INTRAMUSCULAR; INTRAVENOUS at 21:00

## 2019-09-26 RX ADMIN — ENOXAPARIN SODIUM SCH MG: 40 INJECTION SUBCUTANEOUS at 21:30

## 2019-09-26 RX ADMIN — PIPERACILLIN SODIUM AND TAZOBACTAM SODIUM SCH MLS/HR: .25; 2 INJECTION, POWDER, LYOPHILIZED, FOR SOLUTION INTRAVENOUS at 19:12

## 2019-09-26 RX ADMIN — HUMAN INSULIN SCH UNITS: 100 INJECTION, SOLUTION SUBCUTANEOUS at 23:36

## 2019-09-26 RX ADMIN — PIPERACILLIN SODIUM AND TAZOBACTAM SODIUM SCH MLS/HR: .25; 2 INJECTION, POWDER, LYOPHILIZED, FOR SOLUTION INTRAVENOUS at 23:53

## 2019-09-26 RX ADMIN — Medication SCH STRIP: at 23:36

## 2019-09-26 RX ADMIN — POTASSIUM CHLORIDE SCH MLS/HR: 200 INJECTION, SOLUTION INTRAVENOUS at 21:15

## 2019-09-26 RX ADMIN — POTASSIUM CHLORIDE SCH MLS/HR: 200 INJECTION, SOLUTION INTRAVENOUS at 19:40

## 2019-09-26 NOTE — NUR
ICU TRANSFER

PT TRANSFERRED TO ROOM 111, STATUS POST LEFT HEART CATH. IABP INSERTED TO RIGHT GROIN, NO 
BLEEDING OR HEMATOMA NOTED. DRESSING IS CDI. IABP IS SET TO 1:1 AUGMENTATION, AUTO TRIGGER. 
TAI TO GRAVITY DRAINING YELLOW URINE. BILATERAL RADIAL AND PEDAL PULSES 2+, EQUAL, AND 
EXTREMITIES COOL TO TOUCH. CAP REFILL ON BILATERAL LE IS >3. INTUBATED WITH SIZE 8,  23 AT 
THE LIP. VENT SETTINGS: AC RATE 14, , FI02 70%, PEEP 8.

## 2019-09-26 NOTE — NUR
TRANSPORTED PT TO CATH LAB AT THIS TIME. MANUALLY VENTILATED PT WITH 100% FIO2 CARDIAC 
MONITOR AT BEDSIDE. PEEP VALVE ATTACHED TO AMBUBAG. NO INCIDENT REPORTED. VENT SET UP AT 
CATHLAB. SPO2 93%,  WITH GOOD CHEST RISE OBSERVED. WILL CONTINUE TO MONITOR PT.

## 2019-09-26 NOTE — NUR
Raiology call

RN called radiology to inquire about interpretation of the CXR. Radiology tech states they 
will get in touch with radiologist to read XRAY.

## 2019-09-26 NOTE — NUR
MD ROUNDING ON PT

DR. GRAHAM IN TO SEE PT

HE ASSESSED PT AND HE SPOKE TO PT'S , HE DISCUSSED POC WITH PT'S  AND HE ALSO 
CALLED DR. MILLER TO REQUEST FOR HIM TO TAKE PT TO CATH LAB. AT THAT TIME I NOTIFIED DR. GRAHAM OF CRITICAL TROPONIN OF 8.31 AND STARTED PRINTING OUT THE CONSETS FOR HEART 
CRATERIZATION FOR PT'S SPOUSE TO SIGN.

## 2019-09-26 NOTE — NUR
RECEIVED PT FROM CATH LAB

INTUBATED SEDATED ON 5 MG OF VERSED

8.0 ETT SECURED AT 23 CM AT THE LIP, PT HAVING LARGE WHITE FOAMY FROTHY SECRETIONS.

PT HAS  RT CAROTID SWELLING FROM WHERE A RT TLC WAS TAKEN OFF. SITE NO NO ACTIVE BLEEDING AT 
THE TIME. DRESSING IS CLEAN & DRY.

VITAL SIGNS STABLE, PT AFEBRILE. PT HAS EKG WITH SOME ST ELEVATION, PT HAS ALREADY BEEN SEEN 
BY THE CARDIOLOGIST FOR JOSE CAROTID ARTERY. PT STILL PENDING TO BE SEEN BY ED MD TO COMPLETE 
ADMISSION. DISCUSSED POC WITH PT'S . PT'S  IS WAITING TO SPEAK WITH ADMITTING 
MD.

## 2019-09-26 NOTE — NUR
RECEIVED REPORT  BILL FROM CATH LAB

PT COMING TO [facility]/ ICU STATUS PENDING TO BE SEEN BY DR. GODOY.

PT WAS SEEN BY CATH LAB TO GET A CAROTID ARTERY CENTRAL LINE REMOVED.

## 2019-09-26 NOTE — NUR
Hospitalist called back

Informed Dr. Brandon of patient's lab values. He ordered:

-administer 40meq Potassium IV x1

-start patient on Q6H mild scale SSI and Accuchecks starting 0000

-Re-check potassium with AM labs

RN performed TORB and MD verified orders to be correct. No additional orders received.

## 2019-09-26 NOTE — NUR
24 hour urine sample

RN called  to inform of new order for 24 hour urine sample. She states she will have 
phlebotomist bring the urine specimen collection jug.

## 2019-09-26 NOTE — NUR
Initial Assessment

Patient received laying on bed on mechanical ventilation and sedation. Patient is sedated 
with Versed at 10mg/min and is at -4 RASS. RN will titrate down on Versed for goal of -3 
RASS. Ventilator plugged into red outlet, Ambu bag at bedside, oral care and suction 
rendered. PERRL intact and brisk. Hypoactive cough and gag present. Right neck dressing has 
old blood but no fresh blood present-RN will continue to monitor for any s/s of bleeding. 
Abd soft and round. Right groin IABP site present with dressing CDI-on 1:1 augmentation. 
Maintaining goal of 80 means and over 100 augmented pressures.  ST elevated on monitor which 
is unchanged since admission to ICU-no ectopy noted. No bleeding, ecchymosis, or hematoma 
present. Right groin TLC intact and patent x3 ports-running multiple IV infusions-refer to 
IV spreadsheet for titration specifics. F/C intact and draining clear/yellow urine to 
gravity. Neurovascular status intact with strong palpable distal pulses x4 extremities, skin 
cool to touch, and BLE capillary refill sluggish and BUE capillary refill brisk. Bed in 
lowest position, side rails up, bed brakes set, all alarms audible, in direct view of nurses 
station. Continue close monitoring.

## 2019-09-26 NOTE — NUR
Fentanyl initiation

Patient now moving bilateral hands toward ETT and has facial grimacing. RN attempted to 
re-orient patient and non-pharmacological pain measures for comfort but patient remains at 
risk of pulling out ETT and appears to be in pain. No PRN pain medication ordered. Patient 
started on fentanyl gtt per protocol/MD order. Patient tolerating well.

## 2019-09-27 VITALS — SYSTOLIC BLOOD PRESSURE: 109 MMHG | DIASTOLIC BLOOD PRESSURE: 42 MMHG

## 2019-09-27 VITALS — SYSTOLIC BLOOD PRESSURE: 111 MMHG | DIASTOLIC BLOOD PRESSURE: 61 MMHG

## 2019-09-27 VITALS — SYSTOLIC BLOOD PRESSURE: 111 MMHG | DIASTOLIC BLOOD PRESSURE: 59 MMHG

## 2019-09-27 VITALS — SYSTOLIC BLOOD PRESSURE: 110 MMHG | DIASTOLIC BLOOD PRESSURE: 65 MMHG

## 2019-09-27 VITALS — SYSTOLIC BLOOD PRESSURE: 94 MMHG | DIASTOLIC BLOOD PRESSURE: 44 MMHG

## 2019-09-27 VITALS — DIASTOLIC BLOOD PRESSURE: 59 MMHG | SYSTOLIC BLOOD PRESSURE: 107 MMHG

## 2019-09-27 VITALS — DIASTOLIC BLOOD PRESSURE: 57 MMHG | SYSTOLIC BLOOD PRESSURE: 111 MMHG

## 2019-09-27 VITALS — SYSTOLIC BLOOD PRESSURE: 111 MMHG | DIASTOLIC BLOOD PRESSURE: 57 MMHG

## 2019-09-27 VITALS — SYSTOLIC BLOOD PRESSURE: 102 MMHG | DIASTOLIC BLOOD PRESSURE: 58 MMHG

## 2019-09-27 VITALS — DIASTOLIC BLOOD PRESSURE: 64 MMHG | SYSTOLIC BLOOD PRESSURE: 108 MMHG

## 2019-09-27 VITALS — DIASTOLIC BLOOD PRESSURE: 50 MMHG | SYSTOLIC BLOOD PRESSURE: 107 MMHG

## 2019-09-27 VITALS — DIASTOLIC BLOOD PRESSURE: 58 MMHG | SYSTOLIC BLOOD PRESSURE: 105 MMHG

## 2019-09-27 VITALS — DIASTOLIC BLOOD PRESSURE: 62 MMHG | SYSTOLIC BLOOD PRESSURE: 118 MMHG

## 2019-09-27 VITALS — DIASTOLIC BLOOD PRESSURE: 41 MMHG | SYSTOLIC BLOOD PRESSURE: 99 MMHG

## 2019-09-27 VITALS — SYSTOLIC BLOOD PRESSURE: 102 MMHG | DIASTOLIC BLOOD PRESSURE: 47 MMHG

## 2019-09-27 VITALS — SYSTOLIC BLOOD PRESSURE: 108 MMHG | DIASTOLIC BLOOD PRESSURE: 64 MMHG

## 2019-09-27 VITALS — SYSTOLIC BLOOD PRESSURE: 112 MMHG | DIASTOLIC BLOOD PRESSURE: 43 MMHG

## 2019-09-27 VITALS — SYSTOLIC BLOOD PRESSURE: 106 MMHG | DIASTOLIC BLOOD PRESSURE: 43 MMHG

## 2019-09-27 VITALS — SYSTOLIC BLOOD PRESSURE: 107 MMHG | DIASTOLIC BLOOD PRESSURE: 53 MMHG

## 2019-09-27 VITALS — SYSTOLIC BLOOD PRESSURE: 118 MMHG | DIASTOLIC BLOOD PRESSURE: 62 MMHG

## 2019-09-27 VITALS — SYSTOLIC BLOOD PRESSURE: 99 MMHG | DIASTOLIC BLOOD PRESSURE: 41 MMHG

## 2019-09-27 VITALS — SYSTOLIC BLOOD PRESSURE: 110 MMHG | DIASTOLIC BLOOD PRESSURE: 63 MMHG

## 2019-09-27 VITALS — DIASTOLIC BLOOD PRESSURE: 42 MMHG | SYSTOLIC BLOOD PRESSURE: 107 MMHG

## 2019-09-27 VITALS — DIASTOLIC BLOOD PRESSURE: 63 MMHG | SYSTOLIC BLOOD PRESSURE: 110 MMHG

## 2019-09-27 VITALS — SYSTOLIC BLOOD PRESSURE: 110 MMHG | DIASTOLIC BLOOD PRESSURE: 73 MMHG

## 2019-09-27 VITALS — DIASTOLIC BLOOD PRESSURE: 61 MMHG | SYSTOLIC BLOOD PRESSURE: 111 MMHG

## 2019-09-27 VITALS — DIASTOLIC BLOOD PRESSURE: 65 MMHG | SYSTOLIC BLOOD PRESSURE: 118 MMHG

## 2019-09-27 VITALS — SYSTOLIC BLOOD PRESSURE: 107 MMHG | DIASTOLIC BLOOD PRESSURE: 50 MMHG

## 2019-09-27 VITALS — SYSTOLIC BLOOD PRESSURE: 112 MMHG | DIASTOLIC BLOOD PRESSURE: 48 MMHG

## 2019-09-27 VITALS — DIASTOLIC BLOOD PRESSURE: 61 MMHG | SYSTOLIC BLOOD PRESSURE: 114 MMHG

## 2019-09-27 VITALS — DIASTOLIC BLOOD PRESSURE: 65 MMHG | SYSTOLIC BLOOD PRESSURE: 110 MMHG

## 2019-09-27 VITALS — SYSTOLIC BLOOD PRESSURE: 107 MMHG | DIASTOLIC BLOOD PRESSURE: 42 MMHG

## 2019-09-27 LAB
ALBUMIN SERPL-MCNC: 2.6 G/DL (ref 3.4–5)
ALP SERPL-CCNC: 84 U/L (ref 45–117)
ALT SERPL-CCNC: 38 U/L (ref 13–56)
ANION GAP SERPL CALCULATED.3IONS-SCNC: 9 MMOL/L (ref 5–15)
BILIRUB SERPL-MCNC: 0.5 MG/DL (ref 0.2–1)
BUN SERPL-MCNC: 42 MG/DL (ref 7–18)
BUN/CREAT SERPL: 19.4
CALCIUM SERPL-MCNC: 8.9 MG/DL (ref 8.5–10.1)
CHLORIDE SERPL-SCNC: 110 MMOL/L (ref 98–107)
CO2 SERPL-SCNC: 23 MMOL/L (ref 21–32)
GLUCOSE SERPL-MCNC: 223 MG/DL (ref 74–106)
HCT VFR BLD AUTO: 39.7 % (ref 36–46)
HGB BLD-MCNC: 13.2 G/DL (ref 12.2–16.2)
LACTATE PLASV-SCNC: 2.8 MMOL/L (ref 0.4–2)
MCH RBC QN AUTO: 30.4 PG (ref 28–32)
MCV RBC AUTO: 91.8 FL (ref 80–100)
NRBC BLD QL AUTO: 0.2 %
POTASSIUM SERPL-SCNC: 4.7 MMOL/L (ref 3.5–5.1)
PROT SERPL-MCNC: 6.4 G/DL (ref 6.4–8.2)
SODIUM SERPL-SCNC: 142 MMOL/L (ref 136–145)

## 2019-09-27 RX ADMIN — DOBUTAMINE IN DEXTROSE SCH MLS/HR: 100 INJECTION, SOLUTION INTRAVENOUS at 05:41

## 2019-09-27 RX ADMIN — HUMAN INSULIN SCH UNITS: 100 INJECTION, SOLUTION SUBCUTANEOUS at 12:25

## 2019-09-27 RX ADMIN — HUMAN INSULIN SCH UNITS: 100 INJECTION, SOLUTION SUBCUTANEOUS at 05:40

## 2019-09-27 RX ADMIN — LINEZOLID SCH MLS/HR: 600 INJECTION, SOLUTION INTRAVENOUS at 14:32

## 2019-09-27 RX ADMIN — MIDAZOLAM SCH MLS/HR: 5 INJECTION, SOLUTION INTRAMUSCULAR; INTRAVENOUS at 18:56

## 2019-09-27 RX ADMIN — Medication SCH STRIP: at 12:13

## 2019-09-27 RX ADMIN — HUMAN INSULIN SCH UNITS: 100 INJECTION, SOLUTION SUBCUTANEOUS at 17:18

## 2019-09-27 RX ADMIN — DOBUTAMINE IN DEXTROSE SCH MLS/HR: 100 INJECTION, SOLUTION INTRAVENOUS at 18:20

## 2019-09-27 RX ADMIN — ENOXAPARIN SODIUM SCH MG: 40 INJECTION SUBCUTANEOUS at 10:35

## 2019-09-27 RX ADMIN — PIPERACILLIN SODIUM AND TAZOBACTAM SODIUM SCH MLS/HR: .25; 2 INJECTION, POWDER, LYOPHILIZED, FOR SOLUTION INTRAVENOUS at 05:40

## 2019-09-27 RX ADMIN — ENOXAPARIN SODIUM SCH MG: 40 INJECTION SUBCUTANEOUS at 22:00

## 2019-09-27 RX ADMIN — PIPERACILLIN SODIUM AND TAZOBACTAM SODIUM SCH MLS/HR: .25; 2 INJECTION, POWDER, LYOPHILIZED, FOR SOLUTION INTRAVENOUS at 12:14

## 2019-09-27 RX ADMIN — NOREPINEPHRINE BITARTRATE SCH MLS/HR: 1 INJECTION, SOLUTION, CONCENTRATE INTRAVENOUS at 18:20

## 2019-09-27 RX ADMIN — FUROSEMIDE SCH MG: 10 INJECTION, SOLUTION INTRAMUSCULAR; INTRAVENOUS at 10:35

## 2019-09-27 RX ADMIN — DOBUTAMINE IN DEXTROSE SCH MLS/HR: 100 INJECTION, SOLUTION INTRAVENOUS at 13:52

## 2019-09-27 RX ADMIN — SODIUM CHLORIDE SCH MLS/HR: 0.9 INJECTION, SOLUTION INTRAVENOUS at 16:53

## 2019-09-27 RX ADMIN — Medication SCH STRIP: at 17:19

## 2019-09-27 RX ADMIN — PIPERACILLIN SODIUM AND TAZOBACTAM SODIUM SCH MLS/HR: .25; 2 INJECTION, POWDER, LYOPHILIZED, FOR SOLUTION INTRAVENOUS at 17:19

## 2019-09-27 RX ADMIN — Medication SCH STRIP: at 05:40

## 2019-09-27 RX ADMIN — MIDAZOLAM SCH MLS/HR: 5 INJECTION, SOLUTION INTRAMUSCULAR; INTRAVENOUS at 08:57

## 2019-09-27 RX ADMIN — PANTOPRAZOLE SODIUM SCH MG: 40 INJECTION, POWDER, FOR SOLUTION INTRAVENOUS at 17:21

## 2019-09-27 NOTE — NUR
Respiratory note:

AT BEDSIDE FOR ROUTINE VENT CHECK. NO VENT CHANGES OR SXD DONE AT THIS TIME WILL CONTINUE TO 
MONITOR Q2H.

## 2019-09-27 NOTE — NUR
FAMILY AT BEDSIDE

PATIENT'S SPOUSE MITCH AT BEDSIDE AND UPDATED ON PATIENT'S STATUS. THIS NURSE NOTED 
PATIENT TO BE FACIAL GRIMACING AND ATTEMPTING TO REACH AT ETT WITH LEFT HAND. SEDATION 
INCREASED. PATIENT'S SPOUSE AWARE AND STATED HE UNDERSTOOD THAT PATIENT MAY BE GETTING 
AGITATED. ESME SITTING AT PATIENT'S BEDSIDE.

## 2019-09-27 NOTE — NUR
FAMILY/ULTRASOUND AT BEDSIDE

PATIENT'S SPOUSE ESME UPDATED ON PATIENT'S STATUS AND HOSPITALIST/NEPHROLOGY ORDERS. 
ESME VERBALIZED UNDERSTANDING. ULTRASOUND TECH AT BEDSIDE.

## 2019-09-27 NOTE — NUR
Cooling measures 

Temperature 100.2 oral. Cooling measures intact. Patient tolerating well with no s/s of 
shivering or distress noted.

## 2019-09-27 NOTE — NUR
PAGED CARDIOLOGY DR MILLER

TO NOTIFY OF AUGMENTED PRESSURE IN THE HIGH 80'S TO 90'S AND HAVING TO INCREASE LEVOPHED TO 
26 MCG TO KEEP AUG PRESSURE OVER 100 AS ORDERED. AWAITING RESPONSE.

## 2019-09-27 NOTE — NUR
CALL RECEIVED FROM NEPHROLOGY/URINE SENT

DR THEODORE UPDATED ON PATIENT'S STATUS AND REASON FOR CONSULT. ORDERS RECEIVED AND WILL BE 
CARRIED OUT. URINE SENT TO LAB.

## 2019-09-27 NOTE — NUR
Ongoing assessment

Patient now at -3 RASS and is not showing any s/s of pain or discomfort. Patient being 
turned, all bony prominences and heels offloaded with pillows, skin is clean and dry. 
Maintaining IABP precautions (not flexing hips, bending knees, or crossing legs). Patient in 
reverse trendelenberg position. Oral care and suction being performed. Neurovascular status 
remains intact and unchanged with strong palpable distal pulses. Cooling measures remain 
intact with no shivering or distress noted. IABP insertion site remains benign. Good urine 
output noted. All fall and safety precautions are intact. Continue close monitoring.

## 2019-09-27 NOTE — NUR
IV removal

IV to left hand DC'd with clean sterile technique, catheter fully intact. dressing applied 
to site. No bleeding noted. Patient tolerated well.

## 2019-09-27 NOTE — NUR
PULMONOLOGY AT BEDSIDE

DR ARANA UPDATED ON PATIENT'S STATUS AND REASON FOR CONSULT. DR ARANA DISCUSSED PLAN OF CARE 
WITH PATIENT'S SPOUSE ESME AT BEDSIDE - ESME VERBALIZED UNDERSTANDING. NO VERBAL ORDERS 
GIVEN AT THIS TIME.

## 2019-09-27 NOTE — NUR
PROTONIX NOT ADMINISTERED

PER PHARMACY, "SHOULD RECEIVE TONIGHT AND WILL DELIVER FOR ADMINISTRATION TOMORROW". DR YU 
AWARE.

## 2019-09-27 NOTE — NUR
Report given

No changes or incidents to report. Neurovascular status remains intact with palpable distal 
pulses x4 extremities. IABP site remains benign with no s/s of bleeding, bruising, or 
hematoma present. Central line remains free of any s/s of infiltration or phlebitis. Care 
endorsed to day shift RN.

## 2019-09-27 NOTE — NUR
NUTRITION CONSULT/ASSESSMENT NOTES



Please refer to link notes of nutrition screen form filed under the intervention section of 
the plan of care for further details.



Est. Needs: 1550 kcal to 1900 kcal (20-25 kcal/kgBW), 61 gms to 77 gms pro (0.8-1.0 
gms/kgBW). Will continue to monitor pertinent labs and reassess nutrient need prn



Thank you for this consult.

-------------------------------------------------------------------------------

Addendum: 09/27/19 at 1137 by Sonia Horvath RD

-------------------------------------------------------------------------------

Amended: Links added.

## 2019-09-27 NOTE — NUR
CONTACT PHARMACY

REGARDING PENDING PROTONIX DUE EARLIER THIS MORNING. PHARMACY STATED THEY WOULD SEND TO 
UNIT, PENDING RECEIPT FOR ADMINISTRATION, DR YU AWARE.

## 2019-09-27 NOTE — NUR
CARDIOLOGY AT BEDSIDE

DR MILLER UPDATED ON PATIENT'S STATUS, DRIPS, VS, URINE OUTPUT AND PATIENT'S SPOUSE CONCERNS. 
 DISCUSSED DIAGNOSIS WITH ESME, PATIENT'S  AND PLAN OF CARE. ORDERS TO CONSULT 
ONCOLOGY FOR BIOPSY RESULTS. ESME VERBALIZED UNDERSTANDING.

## 2019-09-27 NOTE — NUR
Opening shift note

Report received from night shift RN, morning assessment performed and documented. Patient 
laying in bed, mechanically ventilated and sedated. PERRL intact and brisk. Hypoactive cough 
and gag present. Right neck dressing clean, dry and intact. Right groin IABP site intact 
with dressing CD -on 1:1 augmentation, no bleeding, redness or swelling noted to site. ST 
elevated on monitor noted which is unchanged since admission to ICU with no ectopy. 
Peripheral pulses present with strong palpable radial pulses bilaterally to touch and strong 
lower extremity pulses using doppler. B/L upper extremity capillary refill brisk and B/L 
lower extremity capillary refill sluggish, skin cool to touch on all extremities. Koch 
catheter intact and draining clear/yellow urine to gravity. Fall and safety precautions in 
place. Will continue to monitor during shift.

## 2019-09-27 NOTE — NUR
Bed bath/linen change

Patient given complete CHG bath,all linens and gowns changed. Skin re-assessed for any 
changes and none noted. Patient tolerated well. Optifoam gentle adhesive dressing remains 
CDI to sacral area to prevent friction and shear.

## 2019-09-27 NOTE — NUR
Respiratory note:

RECEIVED PT ON VENT V9, VENT CONNECTED TO RED OUTLET AND O2 SOURCE. ALARMS ARE SET AND 
AUDIBLE. AMBU BAG AND MASK AT BEDSIDE. BS ARE FINE COURSE IN BILATERAL BASES, SXD VIA ETT 
FOR SCANT CLEAR/ WHITE. WILL CONTINUE TO MONITOR, RT NAME AND PAGER ASSIGNMENT WRITTEN ON 
PTS ROOM BOARD.

## 2019-09-27 NOTE — NUR
WOUND CARE NOTE:

Wound care consult received for low Adarsh score.  Patient is a 75yo female admitted for 
respiratory failure.  Patient with a history of hypertension, chronic kidney disease, 
hyperlipidemia, and cervical cancer.  Patient is seen in ICU with bedside RN, Che.  
Patient is currently intubated and on an IABP.  Last Adarsh score is 14.  No open wounds 
noted.  Bilateral lower extremities are mottled and cool to touch.  Heels blanching and are 
off loaded with pillows.

RECOMMENDATIONS:

Dietary consult; turn q2hrs; Nursing to cleanse buttocks with mild soap and water, pat dry, 
apply ZGUARD BID/PRN soiling, may apply sacral OPTIFOAM GENTLE for friction/shear 
prevention; wound care team to follow.

## 2019-09-27 NOTE — NUR
Respiratory note:

SPOKE TO DR ARANA REGARDING POSSIBILITY OF TITRATING PEEP. DR ARANA STATES LEAVE THE PEEP, 
PT CAN GO STRAIGHT TO CPAP WITH PEEP OF 8CMH2O. NO NEW ORDERS GIVEN AT THIS TIME. WILL 
ATTEMPT TO TITRATE FIO2 DURING MY SHIFT. FIO2 RIGHT NOW VIA VENT SET AT 40%. WILL CONTINUE 
TO MONITOR.

## 2019-09-27 NOTE — NUR
24 HR URINE COLLECTION CORRECTION

CONTACT LAB REGARDING 24 HOUR URINE COLLECTION. VERIFIED THAT URINE MUST BE PLACED IN ICE, 
THEREFORE URINE COLLECTION MUST BE RESTARTED. WILL RECEIVING NEW COLLECTION CANISTER. TAI 
CATHETER PLACED IN ICE IN MEANTIME WHILE LAB BRINGS NEW CANISTER.

## 2019-09-28 VITALS — DIASTOLIC BLOOD PRESSURE: 60 MMHG | SYSTOLIC BLOOD PRESSURE: 111 MMHG

## 2019-09-28 VITALS — DIASTOLIC BLOOD PRESSURE: 68 MMHG | SYSTOLIC BLOOD PRESSURE: 121 MMHG

## 2019-09-28 VITALS — SYSTOLIC BLOOD PRESSURE: 120 MMHG | DIASTOLIC BLOOD PRESSURE: 73 MMHG

## 2019-09-28 VITALS — SYSTOLIC BLOOD PRESSURE: 112 MMHG | DIASTOLIC BLOOD PRESSURE: 61 MMHG

## 2019-09-28 VITALS — DIASTOLIC BLOOD PRESSURE: 70 MMHG | SYSTOLIC BLOOD PRESSURE: 114 MMHG

## 2019-09-28 VITALS — SYSTOLIC BLOOD PRESSURE: 124 MMHG | DIASTOLIC BLOOD PRESSURE: 60 MMHG

## 2019-09-28 VITALS — SYSTOLIC BLOOD PRESSURE: 119 MMHG | DIASTOLIC BLOOD PRESSURE: 67 MMHG

## 2019-09-28 VITALS — SYSTOLIC BLOOD PRESSURE: 114 MMHG | DIASTOLIC BLOOD PRESSURE: 70 MMHG

## 2019-09-28 VITALS — SYSTOLIC BLOOD PRESSURE: 119 MMHG | DIASTOLIC BLOOD PRESSURE: 62 MMHG

## 2019-09-28 VITALS — DIASTOLIC BLOOD PRESSURE: 61 MMHG | SYSTOLIC BLOOD PRESSURE: 111 MMHG

## 2019-09-28 VITALS — SYSTOLIC BLOOD PRESSURE: 112 MMHG | DIASTOLIC BLOOD PRESSURE: 69 MMHG

## 2019-09-28 VITALS — DIASTOLIC BLOOD PRESSURE: 63 MMHG | SYSTOLIC BLOOD PRESSURE: 114 MMHG

## 2019-09-28 VITALS — DIASTOLIC BLOOD PRESSURE: 72 MMHG | SYSTOLIC BLOOD PRESSURE: 122 MMHG

## 2019-09-28 VITALS — DIASTOLIC BLOOD PRESSURE: 67 MMHG | SYSTOLIC BLOOD PRESSURE: 124 MMHG

## 2019-09-28 VITALS — DIASTOLIC BLOOD PRESSURE: 75 MMHG | SYSTOLIC BLOOD PRESSURE: 120 MMHG

## 2019-09-28 VITALS — DIASTOLIC BLOOD PRESSURE: 67 MMHG | SYSTOLIC BLOOD PRESSURE: 119 MMHG

## 2019-09-28 VITALS — DIASTOLIC BLOOD PRESSURE: 39 MMHG | SYSTOLIC BLOOD PRESSURE: 99 MMHG

## 2019-09-28 VITALS — SYSTOLIC BLOOD PRESSURE: 116 MMHG | DIASTOLIC BLOOD PRESSURE: 67 MMHG

## 2019-09-28 VITALS — SYSTOLIC BLOOD PRESSURE: 116 MMHG | DIASTOLIC BLOOD PRESSURE: 77 MMHG

## 2019-09-28 VITALS — SYSTOLIC BLOOD PRESSURE: 117 MMHG | DIASTOLIC BLOOD PRESSURE: 74 MMHG

## 2019-09-28 VITALS — SYSTOLIC BLOOD PRESSURE: 107 MMHG | DIASTOLIC BLOOD PRESSURE: 64 MMHG

## 2019-09-28 VITALS — SYSTOLIC BLOOD PRESSURE: 99 MMHG | DIASTOLIC BLOOD PRESSURE: 49 MMHG

## 2019-09-28 VITALS — DIASTOLIC BLOOD PRESSURE: 64 MMHG | SYSTOLIC BLOOD PRESSURE: 107 MMHG

## 2019-09-28 VITALS — SYSTOLIC BLOOD PRESSURE: 92 MMHG | DIASTOLIC BLOOD PRESSURE: 56 MMHG

## 2019-09-28 VITALS — DIASTOLIC BLOOD PRESSURE: 74 MMHG | SYSTOLIC BLOOD PRESSURE: 117 MMHG

## 2019-09-28 VITALS — DIASTOLIC BLOOD PRESSURE: 60 MMHG | SYSTOLIC BLOOD PRESSURE: 115 MMHG

## 2019-09-28 LAB
ALBUMIN SERPL-MCNC: 2.3 G/DL (ref 3.4–5)
ALP SERPL-CCNC: 69 U/L (ref 45–117)
ALT SERPL-CCNC: 37 U/L (ref 13–56)
ANION GAP SERPL CALCULATED.3IONS-SCNC: 8 MMOL/L (ref 5–15)
BILIRUB SERPL-MCNC: 0.8 MG/DL (ref 0.2–1)
BUN SERPL-MCNC: 33 MG/DL (ref 7–18)
BUN/CREAT SERPL: 18
CALCIUM SERPL-MCNC: 8.3 MG/DL (ref 8.5–10.1)
CHLORIDE SERPL-SCNC: 105 MMOL/L (ref 98–107)
CO2 SERPL-SCNC: 25 MMOL/L (ref 21–32)
GLUCOSE SERPL-MCNC: 219 MG/DL (ref 74–106)
HCT VFR BLD AUTO: 34.7 % (ref 36–46)
HGB BLD-MCNC: 11.6 G/DL (ref 12.2–16.2)
LACTATE PLASV-SCNC: 3.2 MMOL/L (ref 0.4–2)
MCH RBC QN AUTO: 30.1 PG (ref 28–32)
MCV RBC AUTO: 89.8 FL (ref 80–100)
NRBC BLD QL AUTO: 0 %
POTASSIUM SERPL-SCNC: 3 MMOL/L (ref 3.5–5.1)
PROT SERPL-MCNC: 5.7 G/DL (ref 6.4–8.2)
SODIUM SERPL-SCNC: 138 MMOL/L (ref 136–145)

## 2019-09-28 RX ADMIN — HUMAN INSULIN SCH UNITS: 100 INJECTION, SOLUTION SUBCUTANEOUS at 06:25

## 2019-09-28 RX ADMIN — PIPERACILLIN SODIUM AND TAZOBACTAM SODIUM SCH MLS/HR: .25; 2 INJECTION, POWDER, LYOPHILIZED, FOR SOLUTION INTRAVENOUS at 23:35

## 2019-09-28 RX ADMIN — PANTOPRAZOLE SODIUM SCH MG: 40 INJECTION, POWDER, FOR SOLUTION INTRAVENOUS at 10:13

## 2019-09-28 RX ADMIN — ENOXAPARIN SODIUM SCH MG: 40 INJECTION SUBCUTANEOUS at 10:14

## 2019-09-28 RX ADMIN — MAGNESIUM SULFATE IN DEXTROSE SCH MLS/HR: 10 INJECTION, SOLUTION INTRAVENOUS at 13:50

## 2019-09-28 RX ADMIN — Medication SCH STRIP: at 12:02

## 2019-09-28 RX ADMIN — ENOXAPARIN SODIUM SCH MG: 40 INJECTION SUBCUTANEOUS at 21:43

## 2019-09-28 RX ADMIN — LINEZOLID SCH MLS/HR: 600 INJECTION, SOLUTION INTRAVENOUS at 03:00

## 2019-09-28 RX ADMIN — Medication SCH STRIP: at 06:26

## 2019-09-28 RX ADMIN — POTASSIUM CHLORIDE SCH MLS/HR: 200 INJECTION, SOLUTION INTRAVENOUS at 12:45

## 2019-09-28 RX ADMIN — NOREPINEPHRINE BITARTRATE SCH MLS/HR: 1 INJECTION, SOLUTION, CONCENTRATE INTRAVENOUS at 21:13

## 2019-09-28 RX ADMIN — MAGNESIUM SULFATE IN DEXTROSE SCH MLS/HR: 10 INJECTION, SOLUTION INTRAVENOUS at 12:01

## 2019-09-28 RX ADMIN — HUMAN INSULIN SCH UNITS: 100 INJECTION, SOLUTION SUBCUTANEOUS at 00:00

## 2019-09-28 RX ADMIN — SODIUM CHLORIDE SCH MLS/HR: 0.9 INJECTION, SOLUTION INTRAVENOUS at 20:00

## 2019-09-28 RX ADMIN — PIPERACILLIN SODIUM AND TAZOBACTAM SODIUM SCH MLS/HR: .25; 2 INJECTION, POWDER, LYOPHILIZED, FOR SOLUTION INTRAVENOUS at 17:20

## 2019-09-28 RX ADMIN — Medication SCH STRIP: at 00:00

## 2019-09-28 RX ADMIN — PIPERACILLIN SODIUM AND TAZOBACTAM SODIUM SCH MLS/HR: .25; 2 INJECTION, POWDER, LYOPHILIZED, FOR SOLUTION INTRAVENOUS at 00:00

## 2019-09-28 RX ADMIN — POTASSIUM CHLORIDE SCH MLS/HR: 200 INJECTION, SOLUTION INTRAVENOUS at 10:12

## 2019-09-28 RX ADMIN — FUROSEMIDE SCH MG: 10 INJECTION, SOLUTION INTRAMUSCULAR; INTRAVENOUS at 10:00

## 2019-09-28 RX ADMIN — HUMAN INSULIN SCH UNITS: 100 INJECTION, SOLUTION SUBCUTANEOUS at 12:17

## 2019-09-28 RX ADMIN — Medication SCH STRIP: at 17:20

## 2019-09-28 RX ADMIN — HUMAN INSULIN SCH UNITS: 100 INJECTION, SOLUTION SUBCUTANEOUS at 23:36

## 2019-09-28 RX ADMIN — SODIUM CHLORIDE SCH MLS/HR: 0.9 INJECTION, SOLUTION INTRAVENOUS at 10:40

## 2019-09-28 RX ADMIN — PIPERACILLIN SODIUM AND TAZOBACTAM SODIUM SCH MLS/HR: .25; 2 INJECTION, POWDER, LYOPHILIZED, FOR SOLUTION INTRAVENOUS at 06:00

## 2019-09-28 RX ADMIN — SODIUM CHLORIDE SCH MLS/HR: 0.9 INJECTION, SOLUTION INTRAVENOUS at 03:45

## 2019-09-28 RX ADMIN — PIPERACILLIN SODIUM AND TAZOBACTAM SODIUM SCH MLS/HR: .25; 2 INJECTION, POWDER, LYOPHILIZED, FOR SOLUTION INTRAVENOUS at 12:02

## 2019-09-28 RX ADMIN — FENTANYL CITRATE SCH MLS/HR: 50 INJECTION, SOLUTION INTRAMUSCULAR; INTRAVENOUS at 12:28

## 2019-09-28 RX ADMIN — DOBUTAMINE IN DEXTROSE SCH MLS/HR: 100 INJECTION, SOLUTION INTRAVENOUS at 05:45

## 2019-09-28 RX ADMIN — Medication SCH STRIP: at 23:34

## 2019-09-28 RX ADMIN — HUMAN INSULIN SCH UNITS: 100 INJECTION, SOLUTION SUBCUTANEOUS at 17:20

## 2019-09-28 RX ADMIN — LINEZOLID SCH MLS/HR: 600 INJECTION, SOLUTION INTRAVENOUS at 14:59

## 2019-09-28 NOTE — NUR
Respiratory note:

AT BEDSIDE FOR ROUTINE VENT CHECK. SXD FOR SCANT CLEAR/WHITE. SHIV SILVESTRE AT BEDSIDE. WILL 
CONTINUE TO MONITOR Q2H.

## 2019-09-28 NOTE — NUR
Specialty mattress

Patient transferred onto specialty air mattress without incident. Patient tolerated move 
well. Patient continues to be turned at least Q2H, all bony prominences and heels offloaded 
with pillows, and skin is being kept clean and dry.

## 2019-09-28 NOTE — NUR
Respiratory note: RECEIVED PATIENT ON V9 ESPRIT VENT ORALLY INTUBATED WITH AN 8.0 ETT 
SECURED VIA NATACHA AT THE 23CM MARKING AT THE LIP, AND MECHANICALLY VENTILATED WITH THE 
CHARTED SETTINGS. SPO2 100%, LUNG SOUNDS CLEAR T/O, NO SECRETIONS WHEN SUCTIONED. SKIN IS 
WARM/DRY TO THE TOUCH AND IS INTACT NEAR NATACHA SITE. THERE IS A NGT PLACED IN THE RIGHT 
NARE AND SECURED TO THE ETT. NON PITTING EDEMA NOTED IN BILATERAL UPPER EXTREMITIES. THERE 
IS A RIGHT FEMORAL IABP PLACED, NO OTHER ADVANCE ACCESS LINES NOTED. AM CXR ASSESSED AND IT 
SHOWS ETT IN SATISFACTORY POSITION SITTING APPROX 4CM ABOVE THE ANTOLIN, NO INDICATION TO 
ADJUST TUBE AT THIS TIME. PATIENT IS UNRESPONSIVE TO BOTH VERBAL/TACTILE STIMULI AND IS 
SEDATED ON VERSED AND FENTANYL DRIPS. SHE IS RESTING COMFORTABLY AND TOLERATING VENT WELL, 
NO CHANGES MADE. VENT PLUGGED INTO RED OUTLET AND ALL ALARMS ARE SET AND AUDIBLE. WILL 
CONTINUE TO ASSESS PATIENT AS WELL AS VENTILATOR FUNCTION.

## 2019-09-28 NOTE — NUR
Cooling measures

Patient's temperature 100.0F. Cooling measures applied. Patient tolerating well with no 
shivering or distress noted.

## 2019-09-28 NOTE — NUR
RT NOTE

RECEIVED PT INTUBATED AND ON VENT V9 ON STATED SETTINGS. VENT IS PLUGGED TO RED OUTLET. 
ALARMS ARE ON AND AUDIBLE AT NURSES STATION. AMBU BAG AT BEDSIDE AND CONNECTED TO O2 SOURCE. 
8.0 ETT IS SECURED AT 23 TO THE [facility] WITH ANCHORFAST. BILATERAL BS ARE CLEAR/DIM, PT 
WAS SUCTIONED FOR SMALL RETURN. PT IS NOTED TO BE ON BALLOON PUMP 1:1. SHIV HAYES AT 
BEDSIDE. CONT AS ORDERED. %

-------------------------------------------------------------------------------

Addendum: 09/28/19 at 2100 by Anjana Palencia RT

-------------------------------------------------------------------------------

Amended: Links added.

## 2019-09-28 NOTE — NUR
Opening shift note/Pulmonology at bedside

Report received from night shift RN, morning assessment performed and documented. Patient 
laying in bed, mechanically ventilated and sedated. PERRL intact and brisk. Hypoactive cough 
and gag present. Right neck dressing clean, dry and intact. Right groin IABP site intact 
with dressing CD - on 1:1 augmentation, no bleeding, redness or swelling noted to site. ST 
elevated on monitor noted which is unchanged since admission to ICU. Peripheral pulses 
present with strong palpable radial pulses bilaterally to touch and strong lower extremity 
pulses using doppler. B/L upper extremity capillary refill brisk and B/L lower extremity 
capillary refill sluggish, skin cool to touch on all extremities. Koch catheter intact and 
draining clear with sediment/yellow urine to gravity. Skin assessment performed and noted 
non-blanchable redness to sacrum with optifoam present. Skin tear to left forearm observed, 
wound care performed, pictures taken and new wound care order placed per protocol, charge 
nurse aware. Fall and safety precautions in place. Will continue to monitor during shift. 

-------------------------------------------------------------------------------

Addendum: 09/28/19 at 1131 by Che Martínez RN

-------------------------------------------------------------------------------

Dr Huston at bedside, updated on patient's status. No verbal orders received at this time.

## 2019-09-28 NOTE — NUR
CT Abd/Pelvis held

due to MD order to wait until IABP pulled prior to taking patient to CT. Will endorse to day 
shift RN.

## 2019-09-28 NOTE — NUR
Initial Assessment

Patient received laying on bed on mechanical ventilation and sedation. Patient is sedated 
with Versed at 5mg/min and Fentanyl at 50mcg/hour. Pt is at -3 RASS. Ventilator plugged into 
red outlet, Ambu bag at bedside, oral care and suction rendered. PERRL intact and brisk. 
Normoactive cough and gag present. Left nare NGT clamped. RN verified proper placement via 
auscultation with air bolus. Right neck dressing CDI-RN will continue to monitor for any s/s 
of bleeding. Abd soft and round. Right groin IABP site benign with no bleeding, bruising, or 
hematoma present. On 1:1 augmentation. Using Levophed to maintain goal of augmented 
pressures greater than 100 per Dr. Kent's orders.  ST elevated on monitor which is unchanged 
since admission to ICU-no ectopy noted.  Right groin TLC intact and patent x3 ports-running 
multiple IV infusions-refer to IV spreadsheet for titration specifics. Optifoam gentle 
adhesive dressing CDI to sacral area with DTI present. Left arm dressing to skin tear is 
CDI. F/C intact and draining clear/yellow urine to gravity. SCD's intact to BLE. 
Neurovascular status intact with  palpable distal pulses x4 extremities, skin cool to touch, 
and BLE capillary refill sluggish and BUE capillary refill brisk. Bed in lowest position, 
side rails up, bed brakes set, all alarms audible, in direct view of nurses station. 
Continue close monitoring.

## 2019-09-28 NOTE — NUR
NGT retraction

Per CXR report, NGT retracted by 4cm. Placement verified via auscultation with air bolus.

## 2019-09-28 NOTE — NUR
NEPHROLOGY AT BEDSIDE

DR THEODORE UPDATED ON PATIENT'S STATUS, DRIPS AND LABS. ORDERS RECEIVED AND WILL BE CARRIED 
OUT. DR THEODORE ALSO ORDERED LASIX TO BE HELD TODAY.

## 2019-09-28 NOTE — NUR
HOSPITALIST/CARDIOLOGY/FAMILY AT BEDSIDE



1255 - CARDIOLOGY AT BEDSIDE: 

THIS NURSE RETURNED FROM [facility] - COVERING CHARGE NURSE SHEELA NOTIFIED THAT DR MILLER ROUNDED 
ON PATIENT AND DISCONTINUED AMIODARONE AND DOBUTAMINE. ORDERED DISCONTINUED BY SHIV COKER. 
WILL MONITOR PATIENT FOR VS CHANGES.



HOSPITALIST AT BEDSIDE:

DR VENEGAS UPDATED ON PATIENT'S STATUS, LABS AND DRIPS. DR VENEGAS AWARE OF DR MILLER'S 
RECOMMENDATIONS AND DR THEODORE'S ELECTROLYTE REPLACEMENT. DR VENEGAS DISCUSSED PLAN OF CARE 
WITH PATIENT'S SPOUSE ESME AND ESME VERBALIZED UNDERSTANDING. 



1325 - MULTIPLE FAMILY MEMBERS AT BEDSIDE.

-------------------------------------------------------------------------------

Addendum: 09/28/19 at 1929 by Che aMrtínez RN

-------------------------------------------------------------------------------

DR VENEGAS AWARE OF PENDING ABD/PENDING CT, WILL RESCHEDULE ONCE BALLOON PUMP IS REMOVED PER 
DR MILLER SCHEDULED TOMORROW 09/29/19.

## 2019-09-28 NOTE — NUR
RT NOTE

ROUTINE VENT CHECK DONE. PT INTUBATED AND ON VENT V9 ON STATED SETTINGS. VENT IS PLUGGED TO 
RED OUTLET. ALARMS ARE ON AND AUDIBLE AT NURSES STATION. AMBU BAG AT BEDSIDE AND CONNECTED 
TO O2 SOURCE. 8.0 ETT IS SECURED AT 23 TO THE [facility] WITH ANCHORFAST. PT IS NOTED TO BE 
ON BALLOON PUMP 1:1. SHIV HAYES AT BEDSIDE. CONT AS ORDERED. %

-------------------------------------------------------------------------------

Addendum: 09/29/19 at 0138 by Anjana Palencia RT

-------------------------------------------------------------------------------

Amended: Links added.

## 2019-09-28 NOTE — NUR
WOUND CARE NOTE: IN TO SEE PATIENT AT THIS TIME PER NURSE REQUEST. PATIENT WAS NOTED THIS AM 
TO HAVE A DARK RED NON BLANCHABLE AREA TO HER MEDIAL SACRUM, AND A SMALL SKIN TEAR TO THE 
RIGHT FOREARM.  PATIENT REMAINS INTUBATED, SEDATED.  BEDSIDE NURSE PHOTOGRAPHED WOUNDS THIS 
AM FOR REFERENCE.  PATIENT TURNED TO RIGHT SIDE. SHE IS NOTED TO HAVE A DARK RED NON 
BLANCHABLE AREA TO THE MEDIAL SACRUM. THERE APPEARS TO BE SOME IMPROVEMENT FROM THIS AM.  
WOUND IS VERY DARK RED, NON BLANCHING.  SKIN REMAINS INTACT.  REAPPLIED OPTIFOAM GENTLE 
SACRAL DRESSING TO AREA.  ORDERED SPECIALTY AIR BED AT THIS TIME. PATIENT TO BE PLACED, 
PENDING DELIVERY BY [facility].  SKIN TEAR TO THE RIGHT FOREARM IS VERY SMALL, MEASURING 0.5 X 
0.5 CM, PARTIAL THICKNESS.  THERAHONEY, OPTIFOAM GENTLE DRESSING APPLIED. NEW WOUND PHOTOS 
TAKEN AT THIS TIME FOR REFERENCE.  RECOMMEND: SPECIALTY AIR MATTRESS, NO MASSAGING OF DARK 
RED/MAROON SKIN TO THE SACRUM, SIDE TO SIDE ONLY POSITIONING, Q 3 DAY PRN DRESSING CHANGE TO 
SKIN TEAR ON RIGHT FOREARM, CONTINUATION WITH ALL OTHER WOUND CARE ORDERS AS PREVIOUSLY 
PRESCRIBED BY MD.  WOUND CARE TEAM WILL CONTINUE TO MONITOR. 

-------------------------------------------------------------------------------

Addendum: 09/28/19 at 1806 by Mariana Morales RN

-------------------------------------------------------------------------------

Amended: Links added.

## 2019-09-28 NOTE — NUR
RT NOTE

ROUTINE VENT CHECK DONE. PT INTUBATED AND ON VENT V9 ON STATED SETTINGS. VENT IS PLUGGED TO 
RED OUTLET. ALARMS ARE ON AND AUDIBLE AT NURSES STATION. AMBU BAG AT BEDSIDE AND CONNECTED 
TO O2 SOURCE. 8.0 ETT IS SECURED AT 23 TO THE [facility] WITH ANCHORFAST. PT IS NOTED TO BE 
ON BALLOON PUMP 1:1. SHIV HAYES AT BEDSIDE. CONT AS ORDERED. %

-------------------------------------------------------------------------------

Addendum: 09/28/19 at 2100 by Anjana Palencia RT

-------------------------------------------------------------------------------

Amended: Links added.

## 2019-09-28 NOTE — NUR
Respiratory note:

AT BEDSIDE FOR ROUTINE VENT CHECK. FIO2 TITRATED TO 35%. SHIV SILVESTRE MADE AWARE OF O2 CHANGE. 
NO SXD DONE AT THIS TIME WILL CONTINUE TO MONITOR Q2H.

## 2019-09-28 NOTE — NUR
Respiratory note:

END OF SHIFT VENT CHECK. HME AND SX CATHETER CHANGED AT THIS TIME. WILL HAVE DAY SHIFT RT 
CONTINUE POC.

## 2019-09-29 VITALS — SYSTOLIC BLOOD PRESSURE: 90 MMHG | DIASTOLIC BLOOD PRESSURE: 63 MMHG

## 2019-09-29 VITALS — SYSTOLIC BLOOD PRESSURE: 111 MMHG | DIASTOLIC BLOOD PRESSURE: 70 MMHG

## 2019-09-29 VITALS — DIASTOLIC BLOOD PRESSURE: 73 MMHG | SYSTOLIC BLOOD PRESSURE: 104 MMHG

## 2019-09-29 VITALS — DIASTOLIC BLOOD PRESSURE: 80 MMHG | SYSTOLIC BLOOD PRESSURE: 114 MMHG

## 2019-09-29 VITALS — SYSTOLIC BLOOD PRESSURE: 106 MMHG | DIASTOLIC BLOOD PRESSURE: 65 MMHG

## 2019-09-29 VITALS — DIASTOLIC BLOOD PRESSURE: 70 MMHG | SYSTOLIC BLOOD PRESSURE: 111 MMHG

## 2019-09-29 VITALS — SYSTOLIC BLOOD PRESSURE: 115 MMHG | DIASTOLIC BLOOD PRESSURE: 61 MMHG

## 2019-09-29 VITALS — SYSTOLIC BLOOD PRESSURE: 114 MMHG | DIASTOLIC BLOOD PRESSURE: 78 MMHG

## 2019-09-29 VITALS — SYSTOLIC BLOOD PRESSURE: 104 MMHG | DIASTOLIC BLOOD PRESSURE: 70 MMHG

## 2019-09-29 VITALS — SYSTOLIC BLOOD PRESSURE: 139 MMHG | DIASTOLIC BLOOD PRESSURE: 70 MMHG

## 2019-09-29 VITALS — SYSTOLIC BLOOD PRESSURE: 142 MMHG | DIASTOLIC BLOOD PRESSURE: 118 MMHG

## 2019-09-29 VITALS — DIASTOLIC BLOOD PRESSURE: 55 MMHG | SYSTOLIC BLOOD PRESSURE: 106 MMHG

## 2019-09-29 VITALS — SYSTOLIC BLOOD PRESSURE: 116 MMHG | DIASTOLIC BLOOD PRESSURE: 77 MMHG

## 2019-09-29 VITALS — DIASTOLIC BLOOD PRESSURE: 64 MMHG | SYSTOLIC BLOOD PRESSURE: 100 MMHG

## 2019-09-29 VITALS — DIASTOLIC BLOOD PRESSURE: 73 MMHG | SYSTOLIC BLOOD PRESSURE: 103 MMHG

## 2019-09-29 VITALS — SYSTOLIC BLOOD PRESSURE: 118 MMHG | DIASTOLIC BLOOD PRESSURE: 58 MMHG

## 2019-09-29 VITALS — SYSTOLIC BLOOD PRESSURE: 101 MMHG | DIASTOLIC BLOOD PRESSURE: 67 MMHG

## 2019-09-29 VITALS — SYSTOLIC BLOOD PRESSURE: 91 MMHG | DIASTOLIC BLOOD PRESSURE: 63 MMHG

## 2019-09-29 VITALS — DIASTOLIC BLOOD PRESSURE: 70 MMHG | SYSTOLIC BLOOD PRESSURE: 110 MMHG

## 2019-09-29 VITALS — DIASTOLIC BLOOD PRESSURE: 109 MMHG | SYSTOLIC BLOOD PRESSURE: 128 MMHG

## 2019-09-29 VITALS — DIASTOLIC BLOOD PRESSURE: 71 MMHG | SYSTOLIC BLOOD PRESSURE: 110 MMHG

## 2019-09-29 VITALS — DIASTOLIC BLOOD PRESSURE: 66 MMHG | SYSTOLIC BLOOD PRESSURE: 96 MMHG

## 2019-09-29 VITALS — SYSTOLIC BLOOD PRESSURE: 90 MMHG | DIASTOLIC BLOOD PRESSURE: 60 MMHG

## 2019-09-29 VITALS — SYSTOLIC BLOOD PRESSURE: 96 MMHG | DIASTOLIC BLOOD PRESSURE: 66 MMHG

## 2019-09-29 VITALS — DIASTOLIC BLOOD PRESSURE: 69 MMHG | SYSTOLIC BLOOD PRESSURE: 101 MMHG

## 2019-09-29 VITALS — SYSTOLIC BLOOD PRESSURE: 93 MMHG | DIASTOLIC BLOOD PRESSURE: 59 MMHG

## 2019-09-29 VITALS — DIASTOLIC BLOOD PRESSURE: 59 MMHG | SYSTOLIC BLOOD PRESSURE: 87 MMHG

## 2019-09-29 VITALS — DIASTOLIC BLOOD PRESSURE: 58 MMHG | SYSTOLIC BLOOD PRESSURE: 118 MMHG

## 2019-09-29 VITALS — SYSTOLIC BLOOD PRESSURE: 86 MMHG | DIASTOLIC BLOOD PRESSURE: 57 MMHG

## 2019-09-29 VITALS — DIASTOLIC BLOOD PRESSURE: 57 MMHG | SYSTOLIC BLOOD PRESSURE: 86 MMHG

## 2019-09-29 VITALS — DIASTOLIC BLOOD PRESSURE: 84 MMHG | SYSTOLIC BLOOD PRESSURE: 125 MMHG

## 2019-09-29 VITALS — SYSTOLIC BLOOD PRESSURE: 123 MMHG | DIASTOLIC BLOOD PRESSURE: 77 MMHG

## 2019-09-29 VITALS — SYSTOLIC BLOOD PRESSURE: 102 MMHG | DIASTOLIC BLOOD PRESSURE: 60 MMHG

## 2019-09-29 VITALS — DIASTOLIC BLOOD PRESSURE: 61 MMHG | SYSTOLIC BLOOD PRESSURE: 96 MMHG

## 2019-09-29 VITALS — DIASTOLIC BLOOD PRESSURE: 68 MMHG | SYSTOLIC BLOOD PRESSURE: 94 MMHG

## 2019-09-29 VITALS — SYSTOLIC BLOOD PRESSURE: 101 MMHG | DIASTOLIC BLOOD PRESSURE: 70 MMHG

## 2019-09-29 VITALS — DIASTOLIC BLOOD PRESSURE: 77 MMHG | SYSTOLIC BLOOD PRESSURE: 123 MMHG

## 2019-09-29 VITALS — DIASTOLIC BLOOD PRESSURE: 54 MMHG | SYSTOLIC BLOOD PRESSURE: 99 MMHG

## 2019-09-29 LAB
ALBUMIN SERPL-MCNC: 2 G/DL (ref 3.4–5)
ALP SERPL-CCNC: 85 U/L (ref 45–117)
ALT SERPL-CCNC: 36 U/L (ref 13–56)
ANION GAP SERPL CALCULATED.3IONS-SCNC: 10 MMOL/L (ref 5–15)
BILIRUB SERPL-MCNC: 0.7 MG/DL (ref 0.2–1)
BUN SERPL-MCNC: 23 MG/DL (ref 7–18)
BUN/CREAT SERPL: 17.4
CALCIUM SERPL-MCNC: 8.6 MG/DL (ref 8.5–10.1)
CHLORIDE SERPL-SCNC: 105 MMOL/L (ref 98–107)
CO2 SERPL-SCNC: 23 MMOL/L (ref 21–32)
GLUCOSE SERPL-MCNC: 138 MG/DL (ref 74–106)
HCT VFR BLD AUTO: 32.3 % (ref 36–46)
HGB BLD-MCNC: 10.9 G/DL (ref 12.2–16.2)
MCH RBC QN AUTO: 30.3 PG (ref 28–32)
MCV RBC AUTO: 89.5 FL (ref 80–100)
NRBC BLD QL AUTO: 0 %
POTASSIUM SERPL-SCNC: 3.2 MMOL/L (ref 3.5–5.1)
PROT SERPL-MCNC: 5.5 G/DL (ref 6.4–8.2)
SODIUM SERPL-SCNC: 138 MMOL/L (ref 136–145)
URATE SERPL-MCNC: 2.9 MG/DL (ref 2.6–6)

## 2019-09-29 RX ADMIN — LINEZOLID SCH MLS/HR: 600 INJECTION, SOLUTION INTRAVENOUS at 14:58

## 2019-09-29 RX ADMIN — HUMAN INSULIN SCH UNITS: 100 INJECTION, SOLUTION SUBCUTANEOUS at 23:58

## 2019-09-29 RX ADMIN — POTASSIUM CHLORIDE SCH MLS/HR: 200 INJECTION, SOLUTION INTRAVENOUS at 15:44

## 2019-09-29 RX ADMIN — PIPERACILLIN SODIUM AND TAZOBACTAM SODIUM SCH MLS/HR: .25; 2 INJECTION, POWDER, LYOPHILIZED, FOR SOLUTION INTRAVENOUS at 17:29

## 2019-09-29 RX ADMIN — PIPERACILLIN SODIUM AND TAZOBACTAM SODIUM SCH MLS/HR: .25; 2 INJECTION, POWDER, LYOPHILIZED, FOR SOLUTION INTRAVENOUS at 11:26

## 2019-09-29 RX ADMIN — POTASSIUM CHLORIDE SCH MLS/HR: 200 INJECTION, SOLUTION INTRAVENOUS at 16:53

## 2019-09-29 RX ADMIN — ENOXAPARIN SODIUM SCH MG: 40 INJECTION SUBCUTANEOUS at 21:57

## 2019-09-29 RX ADMIN — POTASSIUM CHLORIDE AND SODIUM CHLORIDE SCH MLS/HR: 900; 150 INJECTION, SOLUTION INTRAVENOUS at 21:56

## 2019-09-29 RX ADMIN — NOREPINEPHRINE BITARTRATE SCH MLS/HR: 1 INJECTION, SOLUTION, CONCENTRATE INTRAVENOUS at 19:15

## 2019-09-29 RX ADMIN — ENOXAPARIN SODIUM SCH MG: 40 INJECTION SUBCUTANEOUS at 09:26

## 2019-09-29 RX ADMIN — PANTOPRAZOLE SODIUM SCH MG: 40 INJECTION, POWDER, FOR SOLUTION INTRAVENOUS at 09:26

## 2019-09-29 RX ADMIN — Medication SCH STRIP: at 17:29

## 2019-09-29 RX ADMIN — POTASSIUM CHLORIDE AND SODIUM CHLORIDE SCH MLS/HR: 900; 150 INJECTION, SOLUTION INTRAVENOUS at 11:15

## 2019-09-29 RX ADMIN — HUMAN INSULIN SCH UNITS: 100 INJECTION, SOLUTION SUBCUTANEOUS at 17:29

## 2019-09-29 RX ADMIN — SODIUM CHLORIDE SCH MLS/HR: 0.9 INJECTION, SOLUTION INTRAVENOUS at 06:29

## 2019-09-29 RX ADMIN — PIPERACILLIN SODIUM AND TAZOBACTAM SODIUM SCH MLS/HR: .25; 2 INJECTION, POWDER, LYOPHILIZED, FOR SOLUTION INTRAVENOUS at 04:54

## 2019-09-29 RX ADMIN — HUMAN INSULIN SCH UNITS: 100 INJECTION, SOLUTION SUBCUTANEOUS at 11:33

## 2019-09-29 RX ADMIN — LINEZOLID SCH MLS/HR: 600 INJECTION, SOLUTION INTRAVENOUS at 03:16

## 2019-09-29 RX ADMIN — HUMAN INSULIN SCH UNITS: 100 INJECTION, SOLUTION SUBCUTANEOUS at 04:54

## 2019-09-29 RX ADMIN — Medication SCH STRIP: at 04:54

## 2019-09-29 RX ADMIN — MIDAZOLAM SCH MLS/HR: 5 INJECTION, SOLUTION INTRAMUSCULAR; INTRAVENOUS at 12:27

## 2019-09-29 RX ADMIN — FENTANYL CITRATE SCH MLS/HR: 50 INJECTION, SOLUTION INTRAMUSCULAR; INTRAVENOUS at 12:28

## 2019-09-29 RX ADMIN — FUROSEMIDE SCH MG: 10 INJECTION, SOLUTION INTRAMUSCULAR; INTRAVENOUS at 13:15

## 2019-09-29 RX ADMIN — POTASSIUM CHLORIDE SCH MLS/HR: 200 INJECTION, SOLUTION INTRAVENOUS at 11:21

## 2019-09-29 RX ADMIN — POTASSIUM CHLORIDE SCH MLS/HR: 200 INJECTION, SOLUTION INTRAVENOUS at 13:15

## 2019-09-29 RX ADMIN — MIDAZOLAM SCH MLS/HR: 5 INJECTION, SOLUTION INTRAMUSCULAR; INTRAVENOUS at 10:45

## 2019-09-29 RX ADMIN — POTASSIUM CHLORIDE SCH MLS/HR: 200 INJECTION, SOLUTION INTRAVENOUS at 15:15

## 2019-09-29 RX ADMIN — Medication SCH STRIP: at 11:33

## 2019-09-29 RX ADMIN — Medication SCH STRIP: at 23:58

## 2019-09-29 NOTE — NUR
PULMONOLOGY AT BEDSIDE

DR ARANA UPDATED ON PATIENT'S STATUS AND MORNING ABG RESULTS. ORDERS FOR VENT CHANGES 
RECEIVED "DECREASE RATE FROM 14 TO 12 AND DECREASE TIDAL VOLUME FROM 550 . LYNN JACKMAN 
NOTIFIED.

## 2019-09-29 NOTE — NUR
WOUND CARE NOTE: IN TO DO QUICK SKIN ASSESSMENT TO PATIENT'S SACRUM.  NON BLANCHABLE AREA 
LOOKS SLIGHTLY IMPROVED, WITH LESS ERYTHEMA. SKIN REMAINS INTACT, NON BLANCHING. WOUND CARE 
TEAM WILL CONTINUE TO MONITOR.

## 2019-09-29 NOTE — NUR
HEMATOLOGY/ONCOLOGY AT BEDSIDE

DR SEGURA AT BEDSIDE, DISCUSSING PLAN OF CARE WITH PATIENT'S SPOUSE AND PATHOLOGY RESULTS. 
NO VERBAL ORDERS AT THIS TIME.

## 2019-09-29 NOTE — NUR
IV removal

IV to RFA DC'd with clean sterile technique, catheter fully intact. Pressure dressing 
applied to site. Patient tolerated well.No bleeding noted.

## 2019-09-29 NOTE — NUR
RT NOTE

ROUTINE VENT CHECK DONE. PT INTUBATED AND ON VENT V9 ON STATED SETTINGS. VENT IS PLUGGED TO 
RED OUTLET. ALARMS ARE ON AND AUDIBLE AT NURSES STATION. AMBU BAG AT BEDSIDE AND CONNECTED 
TO O2 SOURCE. 8.0 ETT IS SECURED AT 23 TO THE ORAL RIGHT WITH ANCHORFAST. PT IS NOTED TO BE 
ON BALLOON PUMP 1:1. CONT AS ORDERED. PT TEMP 99.3 %

-------------------------------------------------------------------------------

Addendum: 09/29/19 at 0428 by Anjana Palencia RT

-------------------------------------------------------------------------------

Amended: Links added.

## 2019-09-29 NOTE — NUR
CONTACT DR MILLER

TO UPDATE ON PATIENT'S STATUS. ORDERS TO CHANGE AUGMENTATION FROM 1:1 TO 1:2. ORDERS WILL BE 
CARRIED OUT.

## 2019-09-29 NOTE — NUR
VENT SETTINGS CHANGED TO AC RR 12, , PEEP8, 30% FIO2 PER DR ARANA ORDERS. RN AWARE. PT 
TOLERATING CHANGE WELL.

## 2019-09-29 NOTE — NUR
DR MILLER AT BEDSIDE

ORDERS TO OBTAIN SUPPLIES FOR IABP REMOVAL AND CHANGE AUGMENTATION TO 1:3. AWAITING MD 
RETURN.

## 2019-09-29 NOTE — NUR
PULMONOLOGY AT BEDSIDE/NEPHROLOGY

DR ARANA AT BEDSIDE, DISCUSSED PLAN OF CARE WITH PATIENT'S FAMILY (SPOUSE, DAUGHTER AND DTR 
PARTNER). ALL FAMILY MEMBERS VERBALIZED UNDERSTANDING. DR THEODORE UPDATED ON PATIENT'S 
STATUS, LABS AND 24/HR INTAKE/OUTPUT. ORDERS RECEIVED AND TO ADMINISTER LASIX AFTER 
ELECTROLYTE REPLACEMENT. ORDERS ENTERED BY DR THEODORE AND WILL BE CARRIED OUT.

## 2019-09-29 NOTE — NUR
Dressing

Right neck dressing taken off to assess site. Incision is well approximated and closed with 
no erythema or drainage. Left open to air.

## 2019-09-29 NOTE — NUR
ORDER CLARIFICATION

CONTACT DR MILLER TO VERIFY 60 MEQ IV POTASSIUM RIDER ORDER. THIS NURSE NOTIFIED DR MILLER THAT 
PATIENT HAD ALREADY RECEIVED 40 MEQ IV POTASSIUM RIDER PREVIOUSLY ORDERED BY DR THEODORE. 
ORDERS TO ADMINISTER AN ADDITIONAL 20 MEQ POTASSIUM ONLY. ORDERS CARRIED OUT.

## 2019-09-29 NOTE — NUR
FAMILY AT BEDSIDE

PATIENT'S SPOUSE - ESME, DAUGHTER AND DAUGHTER'S PARTNER AT BEDSIDE, UPDATED ON PATIENT'S 
STATUS. ALL VERBALIZED UNDERSTANDING. WILL CONTINUE TO MONITOR.

## 2019-09-29 NOTE — NUR
OPEN REPORT

RECEIVED REPORT. FULL CODE ICU PATIENT. PT INTUBATED AND SEDATED ON VERSED, FENTANYL. SEE IV 
SPREAD SHEET FOR IV FLUIDS AND GTT'S. PERRL, HYPOACTIVE GAG. NOT FOLLOWING COMMANDS BUT 
REACTIVE TO PAINFUL STIMULI.  NSR 70'S, BP 'S ON NO PRESSORS. PERIPHERAL IV SITES ARE 
CLEAN D&I. NGT IN RIGHT AVELAR CLAMPED. TAI PATENT DRAINING LIGHT  BYRON. RIGHT GROIN IABP 
REMOVAL SITE DRESSING FREE OF HEMATOMA. SACRUM HAS NON-BLANCHABLE PURPLE AREA WITH OPTIFOAM 
DRESSING IN PLACE. LFA SKIN TEAR DRESSING D&I. FOR FURTHER HEAD TO TOE ASSESSMENT SEE 
INTERVENTIONS . BED LOCKED AND IN LOWEST POSITION. ALL FALL AND SAFETY PRECAUTIONS IN PLACE.

## 2019-09-29 NOTE — NUR
Opening shift note/Hospitalist/wound care at bedside

Report received from night shift RN, morning assessment performed and documented. Patient 
laying in bed, mechanically ventilated and sedated. PERRL intact and brisk. Hypoactive cough 
and gag present. Right neck dressing removed by night shift rn, area is clean and dry, no 
redness, drainage or swelling noted. Right groin IABP site intact with dressing clean and 
dry - on 1:1 augmentation, no bleeding, redness or swelling noted to site. ST elevated on 
monitor noted which is unchanged since admission to ICU. Peripheral pulses present with 
strong palpable radial pulses bilaterally to touch and strong lower extremity pulses using 
doppler. B/L upper extremity capillary refill brisk and B/L lower extremity capillary refill 
sluggish, skin cool to touch on all extremities. Koch catheter intact and draining clear 
with sediment/yellow urine to gravity. Skin assessment performed with DTI to sacrum - 
optifoam present - patient on specialty mattress and turned to left side for comfort and to 
maintain skin integrity. Skin tear to left forearm covered with optifoam and intact. Mariana, 
wound care rn at bedside and assessed sacral DTI with this nurse. Dr. Card at bedside, 
updated on patient's status, drips, labs and vital signs. Orders to "hold chest and abdomen 
CT until Monday, pending removal of IABP by Dr. Kent today". Fall and safety precautions in 
place. Will continue to monitor during shift.

## 2019-09-29 NOTE — NUR
Hygiene

Patient given CHG bath. All linens and gown changed. Skin re-assessed for any changes and 
none noted. Patient tolerated well.

## 2019-09-29 NOTE — NUR
RT NOTE

ROUTINE VENT CHECK DONE. PT INTUBATED AND ON VENT V9 ON STATED SETTINGS. VENT IS PLUGGED TO 
RED OUTLET. ALARMS ARE ON AND AUDIBLE AT NURSES STATION. AMBU BAG AT BEDSIDE AND CONNECTED 
TO O2 SOURCE. 8.0 ETT IS SECURED AT 23 TO THE [facility] WITH ANCHORFAST. PT IS NOTED TO BE 
ON BALLOON PUMP 1:1. CONT AS ORDERED. PT TEMP 99.9 %

-------------------------------------------------------------------------------

Addendum: 09/29/19 at 0136 by Anjana Palencia RT

-------------------------------------------------------------------------------

Amended: Links added.

## 2019-09-29 NOTE — NUR
DR MILLER AT BEDSIDE/IABP DISCONTINUED



THIS NURSE OUT TO LUNCH, CHARGE NURSE SHEELA COVERING AND ASSISTED DR MILLER IN DISCONTINUING 
IABP. IN THE PROCESS, DR MILLER DISCONTINUED THE RIGHT GROIN CENTRAL LINE AS WELL - IV 
CATHETERS AND BALLOON PUMP FULLY INTACT, PRESSURE DRESSING APPLIED BY DR MILLER . DRESSING TO 
RIGHT GROIN IS CLEAN, DRY AND INTACT, NO REDNESS, SWELLING AND DRAINAGE NOTED TO SITE. IV 
access x2 obtained by Sheela charge nurse via clean sterile technique by inserting a 20 
gauge catheter to the left forearm and 20 gauge catheter at the right AC after one attempt 
each time - IV's secured properly, no trauma to site. Comfort measures applied, partial 
bedding changed and patient cleansed of small, hard, brown stool, jozef-area and catheter 
care performed with soap and water. Patient tolerated well.

## 2019-09-29 NOTE — NUR
RT NOTE

ROUTINE VENT CHECK DONE. PT INTUBATED AND ON VENT V9 ON STATED SETTINGS. VENT IS PLUGGED TO 
RED OUTLET. ALARMS ARE ON AND AUDIBLE AT NURSES STATION. AMBU BAG AT BEDSIDE AND CONNECTED 
TO O2 SOURCE. 8.0 ETT IS SECURED AT 23 TO THE ORAL RIGHT WITH ANCHORFAST. ANCHORFAST WAS 
CHANGED WITHOUT INCIDENT, SHIV HAYES NOTIFIED. HME AND INLINE SUCTION CHANGED WITHOUT 
INCIDENT. PT IS NOTED TO BE ON BALLOON PUMP 1:1. CONT AS ORDERED. PT TEMP 99.5 POX 98%

-------------------------------------------------------------------------------

Addendum: 09/29/19 at 0328 by Anjana Palencia RT

-------------------------------------------------------------------------------

Amended: Links added.

## 2019-09-30 VITALS — DIASTOLIC BLOOD PRESSURE: 95 MMHG | SYSTOLIC BLOOD PRESSURE: 141 MMHG

## 2019-09-30 VITALS — DIASTOLIC BLOOD PRESSURE: 71 MMHG | SYSTOLIC BLOOD PRESSURE: 115 MMHG

## 2019-09-30 VITALS — SYSTOLIC BLOOD PRESSURE: 106 MMHG | DIASTOLIC BLOOD PRESSURE: 66 MMHG

## 2019-09-30 VITALS — SYSTOLIC BLOOD PRESSURE: 116 MMHG | DIASTOLIC BLOOD PRESSURE: 83 MMHG

## 2019-09-30 VITALS — DIASTOLIC BLOOD PRESSURE: 68 MMHG | SYSTOLIC BLOOD PRESSURE: 94 MMHG

## 2019-09-30 VITALS — DIASTOLIC BLOOD PRESSURE: 96 MMHG | SYSTOLIC BLOOD PRESSURE: 133 MMHG

## 2019-09-30 VITALS — SYSTOLIC BLOOD PRESSURE: 141 MMHG | DIASTOLIC BLOOD PRESSURE: 96 MMHG

## 2019-09-30 VITALS — SYSTOLIC BLOOD PRESSURE: 120 MMHG | DIASTOLIC BLOOD PRESSURE: 86 MMHG

## 2019-09-30 VITALS — SYSTOLIC BLOOD PRESSURE: 107 MMHG | DIASTOLIC BLOOD PRESSURE: 70 MMHG

## 2019-09-30 VITALS — SYSTOLIC BLOOD PRESSURE: 111 MMHG | DIASTOLIC BLOOD PRESSURE: 79 MMHG

## 2019-09-30 VITALS — DIASTOLIC BLOOD PRESSURE: 92 MMHG | SYSTOLIC BLOOD PRESSURE: 131 MMHG

## 2019-09-30 VITALS — SYSTOLIC BLOOD PRESSURE: 128 MMHG | DIASTOLIC BLOOD PRESSURE: 85 MMHG

## 2019-09-30 VITALS — DIASTOLIC BLOOD PRESSURE: 85 MMHG | SYSTOLIC BLOOD PRESSURE: 115 MMHG

## 2019-09-30 VITALS — SYSTOLIC BLOOD PRESSURE: 95 MMHG | DIASTOLIC BLOOD PRESSURE: 61 MMHG

## 2019-09-30 VITALS — DIASTOLIC BLOOD PRESSURE: 84 MMHG | SYSTOLIC BLOOD PRESSURE: 123 MMHG

## 2019-09-30 VITALS — SYSTOLIC BLOOD PRESSURE: 110 MMHG | DIASTOLIC BLOOD PRESSURE: 71 MMHG

## 2019-09-30 VITALS — DIASTOLIC BLOOD PRESSURE: 97 MMHG | SYSTOLIC BLOOD PRESSURE: 147 MMHG

## 2019-09-30 VITALS — DIASTOLIC BLOOD PRESSURE: 94 MMHG | SYSTOLIC BLOOD PRESSURE: 133 MMHG

## 2019-09-30 VITALS — DIASTOLIC BLOOD PRESSURE: 57 MMHG | SYSTOLIC BLOOD PRESSURE: 96 MMHG

## 2019-09-30 VITALS — SYSTOLIC BLOOD PRESSURE: 150 MMHG | DIASTOLIC BLOOD PRESSURE: 100 MMHG

## 2019-09-30 VITALS — DIASTOLIC BLOOD PRESSURE: 107 MMHG | SYSTOLIC BLOOD PRESSURE: 157 MMHG

## 2019-09-30 VITALS — DIASTOLIC BLOOD PRESSURE: 85 MMHG | SYSTOLIC BLOOD PRESSURE: 121 MMHG

## 2019-09-30 VITALS — DIASTOLIC BLOOD PRESSURE: 101 MMHG | SYSTOLIC BLOOD PRESSURE: 149 MMHG

## 2019-09-30 VITALS — SYSTOLIC BLOOD PRESSURE: 123 MMHG | DIASTOLIC BLOOD PRESSURE: 84 MMHG

## 2019-09-30 VITALS — DIASTOLIC BLOOD PRESSURE: 66 MMHG | SYSTOLIC BLOOD PRESSURE: 106 MMHG

## 2019-09-30 VITALS — SYSTOLIC BLOOD PRESSURE: 92 MMHG | DIASTOLIC BLOOD PRESSURE: 61 MMHG

## 2019-09-30 VITALS — SYSTOLIC BLOOD PRESSURE: 150 MMHG | DIASTOLIC BLOOD PRESSURE: 102 MMHG

## 2019-09-30 VITALS — SYSTOLIC BLOOD PRESSURE: 110 MMHG | DIASTOLIC BLOOD PRESSURE: 72 MMHG

## 2019-09-30 VITALS — SYSTOLIC BLOOD PRESSURE: 125 MMHG | DIASTOLIC BLOOD PRESSURE: 91 MMHG

## 2019-09-30 VITALS — DIASTOLIC BLOOD PRESSURE: 79 MMHG | SYSTOLIC BLOOD PRESSURE: 111 MMHG

## 2019-09-30 VITALS — SYSTOLIC BLOOD PRESSURE: 102 MMHG | DIASTOLIC BLOOD PRESSURE: 69 MMHG

## 2019-09-30 VITALS — SYSTOLIC BLOOD PRESSURE: 112 MMHG | DIASTOLIC BLOOD PRESSURE: 78 MMHG

## 2019-09-30 VITALS — DIASTOLIC BLOOD PRESSURE: 74 MMHG | SYSTOLIC BLOOD PRESSURE: 111 MMHG

## 2019-09-30 VITALS — DIASTOLIC BLOOD PRESSURE: 75 MMHG | SYSTOLIC BLOOD PRESSURE: 107 MMHG

## 2019-09-30 VITALS — DIASTOLIC BLOOD PRESSURE: 103 MMHG | SYSTOLIC BLOOD PRESSURE: 143 MMHG

## 2019-09-30 VITALS — SYSTOLIC BLOOD PRESSURE: 111 MMHG | DIASTOLIC BLOOD PRESSURE: 77 MMHG

## 2019-09-30 VITALS — SYSTOLIC BLOOD PRESSURE: 144 MMHG | DIASTOLIC BLOOD PRESSURE: 100 MMHG

## 2019-09-30 VITALS — SYSTOLIC BLOOD PRESSURE: 133 MMHG | DIASTOLIC BLOOD PRESSURE: 95 MMHG

## 2019-09-30 VITALS — DIASTOLIC BLOOD PRESSURE: 94 MMHG | SYSTOLIC BLOOD PRESSURE: 138 MMHG

## 2019-09-30 VITALS — SYSTOLIC BLOOD PRESSURE: 121 MMHG | DIASTOLIC BLOOD PRESSURE: 86 MMHG

## 2019-09-30 VITALS — DIASTOLIC BLOOD PRESSURE: 85 MMHG | SYSTOLIC BLOOD PRESSURE: 102 MMHG

## 2019-09-30 VITALS — DIASTOLIC BLOOD PRESSURE: 89 MMHG | SYSTOLIC BLOOD PRESSURE: 126 MMHG

## 2019-09-30 VITALS — SYSTOLIC BLOOD PRESSURE: 137 MMHG | DIASTOLIC BLOOD PRESSURE: 103 MMHG

## 2019-09-30 VITALS — SYSTOLIC BLOOD PRESSURE: 134 MMHG | DIASTOLIC BLOOD PRESSURE: 102 MMHG

## 2019-09-30 VITALS — SYSTOLIC BLOOD PRESSURE: 99 MMHG | DIASTOLIC BLOOD PRESSURE: 73 MMHG

## 2019-09-30 VITALS — SYSTOLIC BLOOD PRESSURE: 116 MMHG | DIASTOLIC BLOOD PRESSURE: 79 MMHG

## 2019-09-30 VITALS — DIASTOLIC BLOOD PRESSURE: 74 MMHG | SYSTOLIC BLOOD PRESSURE: 110 MMHG

## 2019-09-30 VITALS — SYSTOLIC BLOOD PRESSURE: 127 MMHG | DIASTOLIC BLOOD PRESSURE: 89 MMHG

## 2019-09-30 VITALS — SYSTOLIC BLOOD PRESSURE: 113 MMHG | DIASTOLIC BLOOD PRESSURE: 81 MMHG

## 2019-09-30 VITALS — SYSTOLIC BLOOD PRESSURE: 100 MMHG | DIASTOLIC BLOOD PRESSURE: 64 MMHG

## 2019-09-30 VITALS — SYSTOLIC BLOOD PRESSURE: 137 MMHG | DIASTOLIC BLOOD PRESSURE: 95 MMHG

## 2019-09-30 VITALS — DIASTOLIC BLOOD PRESSURE: 91 MMHG | SYSTOLIC BLOOD PRESSURE: 129 MMHG

## 2019-09-30 VITALS — DIASTOLIC BLOOD PRESSURE: 62 MMHG | SYSTOLIC BLOOD PRESSURE: 91 MMHG

## 2019-09-30 VITALS — DIASTOLIC BLOOD PRESSURE: 81 MMHG | SYSTOLIC BLOOD PRESSURE: 119 MMHG

## 2019-09-30 VITALS — SYSTOLIC BLOOD PRESSURE: 106 MMHG | DIASTOLIC BLOOD PRESSURE: 67 MMHG

## 2019-09-30 LAB
ALBUMIN SERPL-MCNC: 1.9 G/DL (ref 3.4–5)
ALP SERPL-CCNC: 137 U/L (ref 45–117)
ALT SERPL-CCNC: 40 U/L (ref 13–56)
ANION GAP SERPL CALCULATED.3IONS-SCNC: 15 MMOL/L (ref 5–15)
BILIRUB SERPL-MCNC: 0.4 MG/DL (ref 0.2–1)
BUN SERPL-MCNC: 21 MG/DL (ref 7–18)
BUN/CREAT SERPL: 17.2
CALCIUM SERPL-MCNC: 8.7 MG/DL (ref 8.5–10.1)
CHLORIDE SERPL-SCNC: 108 MMOL/L (ref 98–107)
CO2 SERPL-SCNC: 17 MMOL/L (ref 21–32)
FERRITIN SERPL-MCNC: 256.1 NG/ML (ref 10–322)
GLUCOSE SERPL-MCNC: 113 MG/DL (ref 74–106)
HCT VFR BLD AUTO: 33.4 % (ref 36–46)
HGB BLD-MCNC: 11.4 G/DL (ref 12.2–16.2)
IRON SERPL-MCNC: 16 UG/DL (ref 50–170)
MCH RBC QN AUTO: 30.5 PG (ref 28–32)
MCV RBC AUTO: 89.7 FL (ref 80–100)
NRBC BLD QL AUTO: 0.1 %
POTASSIUM SERPL-SCNC: 4.1 MMOL/L (ref 3.5–5.1)
PROT SERPL-MCNC: 6.1 G/DL (ref 6.4–8.2)
SODIUM SERPL-SCNC: 140 MMOL/L (ref 136–145)
TIBC SERPL-MCNC: 201 UG/DL (ref 250–450)

## 2019-09-30 RX ADMIN — PIPERACILLIN SODIUM AND TAZOBACTAM SODIUM SCH MLS/HR: .25; 2 INJECTION, POWDER, LYOPHILIZED, FOR SOLUTION INTRAVENOUS at 12:36

## 2019-09-30 RX ADMIN — LINEZOLID SCH MLS/HR: 600 INJECTION, SOLUTION INTRAVENOUS at 03:30

## 2019-09-30 RX ADMIN — Medication SCH STRIP: at 18:19

## 2019-09-30 RX ADMIN — POTASSIUM CHLORIDE AND SODIUM CHLORIDE SCH MLS/HR: 900; 150 INJECTION, SOLUTION INTRAVENOUS at 06:59

## 2019-09-30 RX ADMIN — PIPERACILLIN SODIUM AND TAZOBACTAM SODIUM SCH MLS/HR: .25; 2 INJECTION, POWDER, LYOPHILIZED, FOR SOLUTION INTRAVENOUS at 01:11

## 2019-09-30 RX ADMIN — PIPERACILLIN SODIUM AND TAZOBACTAM SODIUM SCH MLS/HR: .25; 2 INJECTION, POWDER, LYOPHILIZED, FOR SOLUTION INTRAVENOUS at 18:19

## 2019-09-30 RX ADMIN — DEXTROSE SCH MLS/HR: 50 INJECTION, SOLUTION INTRAVENOUS at 12:59

## 2019-09-30 RX ADMIN — PIPERACILLIN SODIUM AND TAZOBACTAM SODIUM SCH MLS/HR: .25; 2 INJECTION, POWDER, LYOPHILIZED, FOR SOLUTION INTRAVENOUS at 23:58

## 2019-09-30 RX ADMIN — PIPERACILLIN SODIUM AND TAZOBACTAM SODIUM SCH MLS/HR: .25; 2 INJECTION, POWDER, LYOPHILIZED, FOR SOLUTION INTRAVENOUS at 06:35

## 2019-09-30 RX ADMIN — FUROSEMIDE SCH MG: 10 INJECTION, SOLUTION INTRAMUSCULAR; INTRAVENOUS at 10:22

## 2019-09-30 RX ADMIN — HUMAN INSULIN SCH UNITS: 100 INJECTION, SOLUTION SUBCUTANEOUS at 18:29

## 2019-09-30 RX ADMIN — HUMAN INSULIN SCH UNITS: 100 INJECTION, SOLUTION SUBCUTANEOUS at 12:00

## 2019-09-30 RX ADMIN — FENTANYL CITRATE SCH MLS/HR: 50 INJECTION, SOLUTION INTRAMUSCULAR; INTRAVENOUS at 22:11

## 2019-09-30 RX ADMIN — ENOXAPARIN SODIUM SCH MG: 40 INJECTION SUBCUTANEOUS at 10:25

## 2019-09-30 RX ADMIN — ENOXAPARIN SODIUM SCH MG: 40 INJECTION SUBCUTANEOUS at 22:06

## 2019-09-30 RX ADMIN — HUMAN INSULIN SCH UNITS: 100 INJECTION, SOLUTION SUBCUTANEOUS at 06:00

## 2019-09-30 RX ADMIN — PANTOPRAZOLE SODIUM SCH MG: 40 INJECTION, POWDER, FOR SOLUTION INTRAVENOUS at 10:25

## 2019-09-30 RX ADMIN — Medication SCH STRIP: at 12:36

## 2019-09-30 RX ADMIN — Medication SCH STRIP: at 06:35

## 2019-09-30 RX ADMIN — NOREPINEPHRINE BITARTRATE SCH MLS/HR: 1 INJECTION, SOLUTION, CONCENTRATE INTRAVENOUS at 22:10

## 2019-09-30 RX ADMIN — DEXTROSE SCH MLS/HR: 5 SOLUTION INTRAVENOUS at 14:15

## 2019-09-30 NOTE — NUR
PT NOT STABLE FOR TRANSPORT TO CT AT THIS TIME. WILL CONTINUE TO CHECK WITH CABRERA RN DURING 
DAY REGARDING PT STATUS.

## 2019-09-30 NOTE — NUR
Nutrition Follow-up Notes



Wt.: 85.0 kg today.

Pt's intubated, non-sedated, no immediate family member at bedside except for RN during 
rounds earlier. Pt remains NPO, no order for alternate nutrition support yet at this time, 
for possible CPAP trial, per nursing. Noted pt's for active Wound consult



Est. Needs: 1550 kcal to 1900 kcal (20-25 kcal/kgBW), 61 gms to 77 gms pro (0.8-1.0 
gms/kgBW). Will continue to monitor pertinent labs and reassess nutrient need prn



Labs: Gluc 111 H, Cl 108 H, BUN 21 H, Cr 1.22 H, AST 16 H,  H, ALP 80 H; Tpro 6.1 L, 
Alb 1.9 L; HbA1c 6.5 H



Skin: Adarsh scale 10, high  risk, pt's medial sacrum DTI per RN documentation. Pls refer to 
latest wound care RN's notes for details re: tx plans.

 

GI: Pt's no bowel activity since 9/26/19 per RN documentation. 



PES:

Increased nutrient needs r/t acute/chronic medical condition aeb intubated, sedated, mod  
hypoalbuminemia,NPO.

Altered nutrition related lab values r/t current/chronic medical condition aeb 
hyperglycemia, hyperchloremia, elev. renal labs, lactic acid , AST, mod hypoalbuminemia 



Will continue to monitor NPO status, skin status, pertinent labs and weight trend. F/u in 2 
to 3 days.

Rec.: 1.) Advance gradually to oral diet when medically appropriate. 2.) If still NPO, 
consider alternate nutrition/EN support preferred with formula choice of Jevity 1.2 Henok @ 60 
ml/hr goal rate as tolerated if medically appropriate. 3.) If Albumin continues trending 
down with improved renal labs, consider Prostat  1 pkt BID. 4.) Refer to  RD for further 
nutrition educ. and weight monitoring upon discharge. 6.) Continue current plan of care.

## 2019-09-30 NOTE — NUR
pt not on sedation at this time

-------------------------------------------------------------------------------

Addendum: 09/30/19 at 2115 by ALESHA CASTILLO RN

-------------------------------------------------------------------------------

Amended: Links added.

## 2019-09-30 NOTE — NUR
END OF SHIFT

GAVE REPORT OF FULL CODE ICU PATIENT TO DAY SHIFT RN. VSS. ALL FALL AND SAFETY PRECAUTIONS 
IN PLACE.

## 2019-09-30 NOTE — NUR
Assessment

Pt is a 74 yr old female on a vent. Pts , Fredis Gallardo, was in the room and answered 
questions. Prior to admit, pt lived with her , Fredis, who is her emergency contact 
at C:[phone], H: [phone].  Prior to admit, pt was ambulatory and able to do mild 
house work and cooking. Pt receives some cleaning help from a neighbor. Pt owns a walker but 
did not use it. Pt used a heightened toilet seat on the toilet. Pts primary is Dr. Flores. Pt does not have AD on file and no interest currently. Pts  stated that he 
has been very happy with the staff and doctors treatment of his wife. D/c planning and 
further needs will be assessed when closer to d/c.

-------------------------------------------------------------------------------

Addendum: 09/30/19 at 1654 by TAWANDA DRISCOLL SS

-------------------------------------------------------------------------------

Amended: Links added.

## 2019-09-30 NOTE — NUR
BED BATH GIVEN

FULL LINEN AND BEDDING CHANGED. COMPLETE BED BAD GIVEN AND TOLERATED WELL. SKIN REASSESSED 
AND NO NEW BREAK DOWN NOTED.

## 2019-09-30 NOTE — NUR
OPENING NOTE

ASSUMED CARE OF PATIENT AT THIS TIME. REPORT RECEIVED FROM DAY SHIFT RN. POC REVIEWED. HEAD 
TO TOE ASSESSMENT COMPLETE, SEE INTERVENTION SPREADSHEET FOR COMPLETE DETAILS. RECEIVED PT 
ON VENTILATOR. NO SEDATION AT THIS TIME. PT WITHDRAWS TO PAIN BUT DOES NOT OPEN EYES OR 
FOLLOW COMMANDS. VSS. IV SITES BENIGN. BED LOCKED AND IN LOWEST POSITION, SAFETY PRECAUTIONS 
IN PLACE. WILL MONITOR PT CAREFULLY.

## 2019-09-30 NOTE — NUR
RT Transport Note:

Patient  transported to CT with RN ENRIQUE MCLEOD. Patient transported to and from procedure on 
ventilator with previous ordered settings. Patient on cardiac monitor with alarms set and 
audible, ambu-bag/mask connected to 02 tank. Patient returned to room with no adverse 
reaction noted. Transport completed without incident.

## 2019-09-30 NOTE — NUR
ELIMINATION

PT HAD MODERATE FORMED BM. PT CLEANED AND REPOSITIONED. NO SKIN INTEGRITY CHANGES NOTED.

## 2019-10-01 VITALS — SYSTOLIC BLOOD PRESSURE: 118 MMHG | DIASTOLIC BLOOD PRESSURE: 81 MMHG

## 2019-10-01 VITALS — SYSTOLIC BLOOD PRESSURE: 117 MMHG | DIASTOLIC BLOOD PRESSURE: 88 MMHG

## 2019-10-01 VITALS — DIASTOLIC BLOOD PRESSURE: 52 MMHG | SYSTOLIC BLOOD PRESSURE: 97 MMHG

## 2019-10-01 VITALS — SYSTOLIC BLOOD PRESSURE: 134 MMHG | DIASTOLIC BLOOD PRESSURE: 101 MMHG

## 2019-10-01 VITALS — SYSTOLIC BLOOD PRESSURE: 121 MMHG | DIASTOLIC BLOOD PRESSURE: 62 MMHG

## 2019-10-01 VITALS — SYSTOLIC BLOOD PRESSURE: 106 MMHG | DIASTOLIC BLOOD PRESSURE: 63 MMHG

## 2019-10-01 VITALS — SYSTOLIC BLOOD PRESSURE: 125 MMHG | DIASTOLIC BLOOD PRESSURE: 91 MMHG

## 2019-10-01 VITALS — DIASTOLIC BLOOD PRESSURE: 93 MMHG | SYSTOLIC BLOOD PRESSURE: 131 MMHG

## 2019-10-01 VITALS — DIASTOLIC BLOOD PRESSURE: 57 MMHG | SYSTOLIC BLOOD PRESSURE: 100 MMHG

## 2019-10-01 VITALS — DIASTOLIC BLOOD PRESSURE: 100 MMHG | SYSTOLIC BLOOD PRESSURE: 139 MMHG

## 2019-10-01 VITALS — DIASTOLIC BLOOD PRESSURE: 92 MMHG | SYSTOLIC BLOOD PRESSURE: 129 MMHG

## 2019-10-01 VITALS — DIASTOLIC BLOOD PRESSURE: 68 MMHG | SYSTOLIC BLOOD PRESSURE: 107 MMHG

## 2019-10-01 VITALS — DIASTOLIC BLOOD PRESSURE: 101 MMHG | SYSTOLIC BLOOD PRESSURE: 134 MMHG

## 2019-10-01 VITALS — SYSTOLIC BLOOD PRESSURE: 121 MMHG | DIASTOLIC BLOOD PRESSURE: 87 MMHG

## 2019-10-01 VITALS — DIASTOLIC BLOOD PRESSURE: 95 MMHG | SYSTOLIC BLOOD PRESSURE: 124 MMHG

## 2019-10-01 VITALS — SYSTOLIC BLOOD PRESSURE: 108 MMHG | DIASTOLIC BLOOD PRESSURE: 69 MMHG

## 2019-10-01 VITALS — SYSTOLIC BLOOD PRESSURE: 96 MMHG | DIASTOLIC BLOOD PRESSURE: 61 MMHG

## 2019-10-01 VITALS — SYSTOLIC BLOOD PRESSURE: 98 MMHG | DIASTOLIC BLOOD PRESSURE: 69 MMHG

## 2019-10-01 VITALS — DIASTOLIC BLOOD PRESSURE: 109 MMHG | SYSTOLIC BLOOD PRESSURE: 139 MMHG

## 2019-10-01 VITALS — DIASTOLIC BLOOD PRESSURE: 23 MMHG | SYSTOLIC BLOOD PRESSURE: 76 MMHG

## 2019-10-01 VITALS — SYSTOLIC BLOOD PRESSURE: 114 MMHG | DIASTOLIC BLOOD PRESSURE: 88 MMHG

## 2019-10-01 VITALS — SYSTOLIC BLOOD PRESSURE: 90 MMHG | DIASTOLIC BLOOD PRESSURE: 49 MMHG

## 2019-10-01 VITALS — DIASTOLIC BLOOD PRESSURE: 61 MMHG | SYSTOLIC BLOOD PRESSURE: 79 MMHG

## 2019-10-01 VITALS — SYSTOLIC BLOOD PRESSURE: 104 MMHG | DIASTOLIC BLOOD PRESSURE: 65 MMHG

## 2019-10-01 VITALS — DIASTOLIC BLOOD PRESSURE: 60 MMHG | SYSTOLIC BLOOD PRESSURE: 90 MMHG

## 2019-10-01 VITALS — SYSTOLIC BLOOD PRESSURE: 127 MMHG | DIASTOLIC BLOOD PRESSURE: 62 MMHG

## 2019-10-01 VITALS — SYSTOLIC BLOOD PRESSURE: 136 MMHG | DIASTOLIC BLOOD PRESSURE: 95 MMHG

## 2019-10-01 VITALS — SYSTOLIC BLOOD PRESSURE: 133 MMHG | DIASTOLIC BLOOD PRESSURE: 95 MMHG

## 2019-10-01 VITALS — SYSTOLIC BLOOD PRESSURE: 113 MMHG | DIASTOLIC BLOOD PRESSURE: 84 MMHG

## 2019-10-01 VITALS — SYSTOLIC BLOOD PRESSURE: 135 MMHG | DIASTOLIC BLOOD PRESSURE: 101 MMHG

## 2019-10-01 VITALS — SYSTOLIC BLOOD PRESSURE: 141 MMHG | DIASTOLIC BLOOD PRESSURE: 100 MMHG

## 2019-10-01 VITALS — DIASTOLIC BLOOD PRESSURE: 80 MMHG | SYSTOLIC BLOOD PRESSURE: 112 MMHG

## 2019-10-01 VITALS — SYSTOLIC BLOOD PRESSURE: 79 MMHG | DIASTOLIC BLOOD PRESSURE: 40 MMHG

## 2019-10-01 VITALS — SYSTOLIC BLOOD PRESSURE: 92 MMHG | DIASTOLIC BLOOD PRESSURE: 50 MMHG

## 2019-10-01 VITALS — SYSTOLIC BLOOD PRESSURE: 144 MMHG | DIASTOLIC BLOOD PRESSURE: 100 MMHG

## 2019-10-01 VITALS — SYSTOLIC BLOOD PRESSURE: 133 MMHG | DIASTOLIC BLOOD PRESSURE: 99 MMHG

## 2019-10-01 VITALS — SYSTOLIC BLOOD PRESSURE: 132 MMHG | DIASTOLIC BLOOD PRESSURE: 93 MMHG

## 2019-10-01 VITALS — DIASTOLIC BLOOD PRESSURE: 96 MMHG | SYSTOLIC BLOOD PRESSURE: 131 MMHG

## 2019-10-01 VITALS — SYSTOLIC BLOOD PRESSURE: 124 MMHG | DIASTOLIC BLOOD PRESSURE: 92 MMHG

## 2019-10-01 VITALS — DIASTOLIC BLOOD PRESSURE: 73 MMHG | SYSTOLIC BLOOD PRESSURE: 95 MMHG

## 2019-10-01 VITALS — SYSTOLIC BLOOD PRESSURE: 143 MMHG | DIASTOLIC BLOOD PRESSURE: 103 MMHG

## 2019-10-01 VITALS — SYSTOLIC BLOOD PRESSURE: 129 MMHG | DIASTOLIC BLOOD PRESSURE: 90 MMHG

## 2019-10-01 VITALS — SYSTOLIC BLOOD PRESSURE: 139 MMHG | DIASTOLIC BLOOD PRESSURE: 100 MMHG

## 2019-10-01 VITALS — DIASTOLIC BLOOD PRESSURE: 47 MMHG | SYSTOLIC BLOOD PRESSURE: 138 MMHG

## 2019-10-01 VITALS — SYSTOLIC BLOOD PRESSURE: 124 MMHG | DIASTOLIC BLOOD PRESSURE: 63 MMHG

## 2019-10-01 VITALS — SYSTOLIC BLOOD PRESSURE: 136 MMHG | DIASTOLIC BLOOD PRESSURE: 104 MMHG

## 2019-10-01 VITALS — SYSTOLIC BLOOD PRESSURE: 66 MMHG | DIASTOLIC BLOOD PRESSURE: 38 MMHG

## 2019-10-01 VITALS — SYSTOLIC BLOOD PRESSURE: 117 MMHG | DIASTOLIC BLOOD PRESSURE: 90 MMHG

## 2019-10-01 VITALS — DIASTOLIC BLOOD PRESSURE: 87 MMHG | SYSTOLIC BLOOD PRESSURE: 119 MMHG

## 2019-10-01 VITALS — DIASTOLIC BLOOD PRESSURE: 100 MMHG | SYSTOLIC BLOOD PRESSURE: 144 MMHG

## 2019-10-01 VITALS — SYSTOLIC BLOOD PRESSURE: 121 MMHG | DIASTOLIC BLOOD PRESSURE: 91 MMHG

## 2019-10-01 VITALS — SYSTOLIC BLOOD PRESSURE: 127 MMHG | DIASTOLIC BLOOD PRESSURE: 90 MMHG

## 2019-10-01 VITALS — DIASTOLIC BLOOD PRESSURE: 88 MMHG | SYSTOLIC BLOOD PRESSURE: 120 MMHG

## 2019-10-01 VITALS — DIASTOLIC BLOOD PRESSURE: 37 MMHG | SYSTOLIC BLOOD PRESSURE: 92 MMHG

## 2019-10-01 VITALS — DIASTOLIC BLOOD PRESSURE: 87 MMHG | SYSTOLIC BLOOD PRESSURE: 118 MMHG

## 2019-10-01 VITALS — DIASTOLIC BLOOD PRESSURE: 62 MMHG | SYSTOLIC BLOOD PRESSURE: 127 MMHG

## 2019-10-01 VITALS — DIASTOLIC BLOOD PRESSURE: 100 MMHG | SYSTOLIC BLOOD PRESSURE: 141 MMHG

## 2019-10-01 VITALS — SYSTOLIC BLOOD PRESSURE: 138 MMHG | DIASTOLIC BLOOD PRESSURE: 99 MMHG

## 2019-10-01 VITALS — DIASTOLIC BLOOD PRESSURE: 91 MMHG | SYSTOLIC BLOOD PRESSURE: 121 MMHG

## 2019-10-01 VITALS — SYSTOLIC BLOOD PRESSURE: 101 MMHG | DIASTOLIC BLOOD PRESSURE: 97 MMHG

## 2019-10-01 VITALS — DIASTOLIC BLOOD PRESSURE: 73 MMHG | SYSTOLIC BLOOD PRESSURE: 97 MMHG

## 2019-10-01 VITALS — SYSTOLIC BLOOD PRESSURE: 139 MMHG | DIASTOLIC BLOOD PRESSURE: 109 MMHG

## 2019-10-01 VITALS — SYSTOLIC BLOOD PRESSURE: 110 MMHG | DIASTOLIC BLOOD PRESSURE: 56 MMHG

## 2019-10-01 VITALS — SYSTOLIC BLOOD PRESSURE: 103 MMHG | DIASTOLIC BLOOD PRESSURE: 54 MMHG

## 2019-10-01 VITALS — DIASTOLIC BLOOD PRESSURE: 66 MMHG | SYSTOLIC BLOOD PRESSURE: 95 MMHG

## 2019-10-01 VITALS — SYSTOLIC BLOOD PRESSURE: 95 MMHG | DIASTOLIC BLOOD PRESSURE: 71 MMHG

## 2019-10-01 VITALS — DIASTOLIC BLOOD PRESSURE: 82 MMHG | SYSTOLIC BLOOD PRESSURE: 111 MMHG

## 2019-10-01 VITALS — DIASTOLIC BLOOD PRESSURE: 96 MMHG | SYSTOLIC BLOOD PRESSURE: 128 MMHG

## 2019-10-01 VITALS — SYSTOLIC BLOOD PRESSURE: 93 MMHG | DIASTOLIC BLOOD PRESSURE: 69 MMHG

## 2019-10-01 VITALS — DIASTOLIC BLOOD PRESSURE: 100 MMHG | SYSTOLIC BLOOD PRESSURE: 137 MMHG

## 2019-10-01 VITALS — DIASTOLIC BLOOD PRESSURE: 90 MMHG | SYSTOLIC BLOOD PRESSURE: 127 MMHG

## 2019-10-01 VITALS — DIASTOLIC BLOOD PRESSURE: 94 MMHG | SYSTOLIC BLOOD PRESSURE: 142 MMHG

## 2019-10-01 VITALS — DIASTOLIC BLOOD PRESSURE: 95 MMHG | SYSTOLIC BLOOD PRESSURE: 133 MMHG

## 2019-10-01 VITALS — SYSTOLIC BLOOD PRESSURE: 119 MMHG | DIASTOLIC BLOOD PRESSURE: 77 MMHG

## 2019-10-01 VITALS — DIASTOLIC BLOOD PRESSURE: 56 MMHG | SYSTOLIC BLOOD PRESSURE: 93 MMHG

## 2019-10-01 VITALS — SYSTOLIC BLOOD PRESSURE: 149 MMHG | DIASTOLIC BLOOD PRESSURE: 120 MMHG

## 2019-10-01 VITALS — SYSTOLIC BLOOD PRESSURE: 96 MMHG | DIASTOLIC BLOOD PRESSURE: 54 MMHG

## 2019-10-01 VITALS — DIASTOLIC BLOOD PRESSURE: 56 MMHG | SYSTOLIC BLOOD PRESSURE: 110 MMHG

## 2019-10-01 VITALS — SYSTOLIC BLOOD PRESSURE: 138 MMHG | DIASTOLIC BLOOD PRESSURE: 100 MMHG

## 2019-10-01 VITALS — SYSTOLIC BLOOD PRESSURE: 132 MMHG | DIASTOLIC BLOOD PRESSURE: 98 MMHG

## 2019-10-01 VITALS — SYSTOLIC BLOOD PRESSURE: 118 MMHG | DIASTOLIC BLOOD PRESSURE: 47 MMHG

## 2019-10-01 VITALS — DIASTOLIC BLOOD PRESSURE: 60 MMHG | SYSTOLIC BLOOD PRESSURE: 97 MMHG

## 2019-10-01 VITALS — DIASTOLIC BLOOD PRESSURE: 98 MMHG | SYSTOLIC BLOOD PRESSURE: 130 MMHG

## 2019-10-01 VITALS — DIASTOLIC BLOOD PRESSURE: 92 MMHG | SYSTOLIC BLOOD PRESSURE: 131 MMHG

## 2019-10-01 LAB
ALBUMIN SERPL-MCNC: 2 G/DL (ref 3.4–5)
ALP SERPL-CCNC: 193 U/L (ref 45–117)
ALT SERPL-CCNC: 182 U/L (ref 13–56)
ANION GAP SERPL CALCULATED.3IONS-SCNC: 10 MMOL/L (ref 5–15)
BILIRUB SERPL-MCNC: 0.4 MG/DL (ref 0.2–1)
BUN SERPL-MCNC: 31 MG/DL (ref 7–18)
BUN/CREAT SERPL: 20.3
CALCIUM SERPL-MCNC: 9 MG/DL (ref 8.5–10.1)
CHLORIDE SERPL-SCNC: 110 MMOL/L (ref 98–107)
CO2 SERPL-SCNC: 21 MMOL/L (ref 21–32)
GLUCOSE SERPL-MCNC: 139 MG/DL (ref 74–106)
HCT VFR BLD AUTO: 31.9 % (ref 36–46)
HGB BLD-MCNC: 10.6 G/DL (ref 12.2–16.2)
MCH RBC QN AUTO: 30 PG (ref 28–32)
MCV RBC AUTO: 90.3 FL (ref 80–100)
NRBC BLD QL AUTO: 0.1 %
POTASSIUM SERPL-SCNC: 3.9 MMOL/L (ref 3.5–5.1)
PROT SERPL-MCNC: 6.2 G/DL (ref 6.4–8.2)
SODIUM SERPL-SCNC: 141 MMOL/L (ref 136–145)

## 2019-10-01 RX ADMIN — DEXTROSE SCH MLS/HR: 5 SOLUTION INTRAVENOUS at 07:39

## 2019-10-01 RX ADMIN — Medication SCH STRIP: at 18:32

## 2019-10-01 RX ADMIN — HUMAN INSULIN SCH UNITS: 100 INJECTION, SOLUTION SUBCUTANEOUS at 00:00

## 2019-10-01 RX ADMIN — PIPERACILLIN SODIUM AND TAZOBACTAM SODIUM SCH MLS/HR: .25; 2 INJECTION, POWDER, LYOPHILIZED, FOR SOLUTION INTRAVENOUS at 12:40

## 2019-10-01 RX ADMIN — HUMAN INSULIN SCH UNITS: 100 INJECTION, SOLUTION SUBCUTANEOUS at 18:00

## 2019-10-01 RX ADMIN — LABETALOL HCL SCH MG: 200 TABLET, FILM COATED ORAL at 22:00

## 2019-10-01 RX ADMIN — Medication SCH STRIP: at 12:40

## 2019-10-01 RX ADMIN — Medication SCH STRIP: at 00:04

## 2019-10-01 RX ADMIN — DEXTROSE SCH MLS/HR: 50 INJECTION, SOLUTION INTRAVENOUS at 11:24

## 2019-10-01 RX ADMIN — NOREPINEPHRINE BITARTRATE SCH MLS/HR: 1 INJECTION, SOLUTION, CONCENTRATE INTRAVENOUS at 21:30

## 2019-10-01 RX ADMIN — LABETALOL HCL SCH MG: 200 TABLET, FILM COATED ORAL at 10:15

## 2019-10-01 RX ADMIN — FUROSEMIDE SCH MG: 10 INJECTION, SOLUTION INTRAMUSCULAR; INTRAVENOUS at 10:34

## 2019-10-01 RX ADMIN — PIPERACILLIN SODIUM AND TAZOBACTAM SODIUM SCH MLS/HR: .25; 2 INJECTION, POWDER, LYOPHILIZED, FOR SOLUTION INTRAVENOUS at 06:23

## 2019-10-01 RX ADMIN — ENOXAPARIN SODIUM SCH MG: 40 INJECTION SUBCUTANEOUS at 10:21

## 2019-10-01 RX ADMIN — HUMAN INSULIN SCH UNITS: 100 INJECTION, SOLUTION SUBCUTANEOUS at 12:00

## 2019-10-01 RX ADMIN — PANTOPRAZOLE SODIUM SCH MG: 40 INJECTION, POWDER, FOR SOLUTION INTRAVENOUS at 10:34

## 2019-10-01 RX ADMIN — PIPERACILLIN SODIUM AND TAZOBACTAM SODIUM SCH MLS/HR: .25; 2 INJECTION, POWDER, LYOPHILIZED, FOR SOLUTION INTRAVENOUS at 18:14

## 2019-10-01 RX ADMIN — HUMAN INSULIN SCH UNITS: 100 INJECTION, SOLUTION SUBCUTANEOUS at 06:00

## 2019-10-01 RX ADMIN — Medication SCH STRIP: at 06:23

## 2019-10-01 NOTE — NUR
VERY LARGE AMOUNT BM

CHUNKY FORMED AND SOFT BROWN STOOL. PT CLEANSED WITH SOAP AND WATER. LINENS CHANGED

## 2019-10-01 NOTE — NUR
Respiratory note:

PER DR ERIKA BAUMAN, PEEP TITRATED TO 7CMH2O. SHIV DONAHUE AT BEDSIDE AND AWARE OF CHANGE.

## 2019-10-01 NOTE — NUR
OPENING SHIFT 



RECEIVED REPORT FROM DAY SHIFT RN. ASSUMED CARE OF PATIENT. PATIENT IN BED, VENTED - ET SIZE 
8 22 AT THE LIP,NO SIGNS OR SYMPTOMS OF SOB, PAIN OR DISTRESS. PATIENT HAS A COUGH AND GAG 
RESPONSE WHEN SUCTIONED. NGT TO THE RIGHT NARE, VERIFIED PLACEMENT VIA AUSCULTATION WITH 
10CC OF AIR, MINIMAL RESIDUAL PULLED BACK. CURRENT VENT SETTINGS - AC 12 /  / PEEP 7 / 
FI02 30%, 02 SAT - 90%. RIGHT ANTECUBITAL IV - CLEAN/DRY/INTACT, LEFT FOREARM IV REMOVED - 
CATHETER INTACT. TAI AND FLEXISEAL HUNG TO GRAVITY ON BED RAIL. REPOSITIONED FOR COMFORT. 
WILL CONTINUE TO MONITOR.

## 2019-10-01 NOTE — NUR
Bathing/linen change

Pt given bed bath with partial linen change. Pt had large formed bm at this time. Skin 
assessed for integrity changes, none noted. Pt repositioned for safety and comfort. Suction 
tubing and canister changed at this time.

## 2019-10-01 NOTE — NUR
WOUND CARE NOTE:

Brief reassessment done by wound care team.  Patient seen with bedside RN, Cassia.  Patient 
still with pressure injury to sacrum.  Purple area of discoloration appears to have 
decreased in size and surrounding area is red, blanching while entire area remains intact.  
Patient has been incontinent of stool multiple times today.  Stool is becoming increasingly 
more liquid, recommend nursing to place rectal tube.  Wound care team to continue to follow.

## 2019-10-01 NOTE — NUR
DR. GRAHAM AT BEDSIDE

NO FAMILY AT BEDSIDE AT THIS TIME. DID INFORM DR. MERAZ ABOUT NEW FINDING OF MOTTLED COLD 
FEET. HE IS AWARE BUT NO NEW ORDERS AT THIS TIME. WRAPPED PT'S FEET WITH WARM BLANKETS AND 
PULSES AUSCULTATED WITH DOPPLER. CAP REFILL GREATER THAN 3 MINUTES. WILL CONTINUE TO MONITOR

## 2019-10-01 NOTE — NUR
CENTRAL LINE CONSENT



CALLED  [facility] AND UPDATED HIM ON PATIENT STATS. RECEIVED TELEPHONE CONSENT FOR 
CENTRAL LINE PLACEMENT, VERIFIED WITH CONSTANTINE CHANEY. HOSPITALIST MADE AWARE.

## 2019-10-01 NOTE — NUR
CENTRAL LINE PLACEMENT 



HOSPITALIST AT BEDSIDE INSERTING RIGHT INTRAJUGULAR TLC. STAT CHEST XRAY ORDERED TO VERIFY 
PLACEMENT.

## 2019-10-01 NOTE — NUR
Respiratory note:

RECEIVED PT ON VENT V9 VENT CONNECTED TO RED OUTLET AND O2 SOURCE. ALARMS ARE SET AND 
AUDIBLE AMBU BAG AND MASK AT BEDSIDE. BS ARE DIMINISHED T/O, SXD FOR SCANT CLEAR/TAN RETURN. 
RT NAME AND PAGER ASSIGNMENT WRITTEN ON PTS ROOM BOARD. WILL CONTINUE TO MONITOR.

## 2019-10-02 VITALS — SYSTOLIC BLOOD PRESSURE: 134 MMHG | DIASTOLIC BLOOD PRESSURE: 35 MMHG

## 2019-10-02 VITALS — DIASTOLIC BLOOD PRESSURE: 27 MMHG | SYSTOLIC BLOOD PRESSURE: 136 MMHG

## 2019-10-02 VITALS — SYSTOLIC BLOOD PRESSURE: 87 MMHG | DIASTOLIC BLOOD PRESSURE: 64 MMHG

## 2019-10-02 VITALS — SYSTOLIC BLOOD PRESSURE: 54 MMHG | DIASTOLIC BLOOD PRESSURE: 35 MMHG

## 2019-10-02 VITALS — SYSTOLIC BLOOD PRESSURE: 93 MMHG | DIASTOLIC BLOOD PRESSURE: 29 MMHG

## 2019-10-02 VITALS — SYSTOLIC BLOOD PRESSURE: 134 MMHG | DIASTOLIC BLOOD PRESSURE: 92 MMHG

## 2019-10-02 VITALS — SYSTOLIC BLOOD PRESSURE: 78 MMHG | DIASTOLIC BLOOD PRESSURE: 32 MMHG

## 2019-10-02 VITALS — DIASTOLIC BLOOD PRESSURE: 64 MMHG | SYSTOLIC BLOOD PRESSURE: 87 MMHG

## 2019-10-02 VITALS — DIASTOLIC BLOOD PRESSURE: 50 MMHG | SYSTOLIC BLOOD PRESSURE: 115 MMHG

## 2019-10-02 VITALS — SYSTOLIC BLOOD PRESSURE: 97 MMHG | DIASTOLIC BLOOD PRESSURE: 66 MMHG

## 2019-10-02 VITALS — DIASTOLIC BLOOD PRESSURE: 36 MMHG | SYSTOLIC BLOOD PRESSURE: 92 MMHG

## 2019-10-02 VITALS — SYSTOLIC BLOOD PRESSURE: 94 MMHG | DIASTOLIC BLOOD PRESSURE: 65 MMHG

## 2019-10-02 VITALS — DIASTOLIC BLOOD PRESSURE: 77 MMHG | SYSTOLIC BLOOD PRESSURE: 100 MMHG

## 2019-10-02 VITALS — DIASTOLIC BLOOD PRESSURE: 22 MMHG | SYSTOLIC BLOOD PRESSURE: 120 MMHG

## 2019-10-02 VITALS — SYSTOLIC BLOOD PRESSURE: 148 MMHG | DIASTOLIC BLOOD PRESSURE: 47 MMHG

## 2019-10-02 VITALS — DIASTOLIC BLOOD PRESSURE: 38 MMHG | SYSTOLIC BLOOD PRESSURE: 125 MMHG

## 2019-10-02 VITALS — DIASTOLIC BLOOD PRESSURE: 37 MMHG | SYSTOLIC BLOOD PRESSURE: 59 MMHG

## 2019-10-02 VITALS — SYSTOLIC BLOOD PRESSURE: 125 MMHG | DIASTOLIC BLOOD PRESSURE: 38 MMHG

## 2019-10-02 VITALS — DIASTOLIC BLOOD PRESSURE: 42 MMHG | SYSTOLIC BLOOD PRESSURE: 115 MMHG

## 2019-10-02 VITALS — DIASTOLIC BLOOD PRESSURE: 47 MMHG | SYSTOLIC BLOOD PRESSURE: 148 MMHG

## 2019-10-02 VITALS — DIASTOLIC BLOOD PRESSURE: 49 MMHG | SYSTOLIC BLOOD PRESSURE: 103 MMHG

## 2019-10-02 VITALS — DIASTOLIC BLOOD PRESSURE: 35 MMHG | SYSTOLIC BLOOD PRESSURE: 134 MMHG

## 2019-10-02 VITALS — DIASTOLIC BLOOD PRESSURE: 68 MMHG | SYSTOLIC BLOOD PRESSURE: 96 MMHG

## 2019-10-02 VITALS — SYSTOLIC BLOOD PRESSURE: 191 MMHG | DIASTOLIC BLOOD PRESSURE: 120 MMHG

## 2019-10-02 VITALS — SYSTOLIC BLOOD PRESSURE: 42 MMHG | DIASTOLIC BLOOD PRESSURE: 25 MMHG

## 2019-10-02 VITALS — SYSTOLIC BLOOD PRESSURE: 108 MMHG | DIASTOLIC BLOOD PRESSURE: 56 MMHG

## 2019-10-02 VITALS — DIASTOLIC BLOOD PRESSURE: 32 MMHG | SYSTOLIC BLOOD PRESSURE: 78 MMHG

## 2019-10-02 VITALS — SYSTOLIC BLOOD PRESSURE: 66 MMHG | DIASTOLIC BLOOD PRESSURE: 44 MMHG

## 2019-10-02 LAB
ALBUMIN SERPL-MCNC: 1.8 G/DL (ref 3.4–5)
ALP SERPL-CCNC: 293 U/L (ref 45–117)
ALT SERPL-CCNC: 9642 U/L (ref 13–56)
ANION GAP SERPL CALCULATED.3IONS-SCNC: 28 MMOL/L (ref 5–15)
BILIRUB SERPL-MCNC: 1.4 MG/DL (ref 0.2–1)
BUN SERPL-MCNC: 55 MG/DL (ref 7–18)
BUN/CREAT SERPL: 15.9
CALCIUM SERPL-MCNC: 8.8 MG/DL (ref 8.5–10.1)
CHLORIDE SERPL-SCNC: 104 MMOL/L (ref 98–107)
CO2 SERPL-SCNC: 7 MMOL/L (ref 21–32)
GLUCOSE SERPL-MCNC: 97 MG/DL (ref 74–106)
HCT VFR BLD AUTO: 36.3 % (ref 36–46)
HGB BLD-MCNC: 11.4 G/DL (ref 12.2–16.2)
LACTATE PLASV-SCNC: 21.9 MMOL/L (ref 0.4–2)
MAGNESIUM SERPL-MCNC: 3.1 MG/DL (ref 1.6–2.6)
MCH RBC QN AUTO: 30.5 PG (ref 28–32)
MCV RBC AUTO: 96.8 FL (ref 80–100)
NRBC BLD QL AUTO: 0.2 %
POTASSIUM SERPL-SCNC: 6.6 MMOL/L (ref 3.5–5.1)
PROT SERPL-MCNC: 5.9 G/DL (ref 6.4–8.2)
SODIUM SERPL-SCNC: 139 MMOL/L (ref 136–145)

## 2019-10-02 PROCEDURE — B244ZZZ ULTRASONOGRAPHY OF RIGHT HEART: ICD-10-PCS | Performed by: INTERNAL MEDICINE

## 2019-10-02 PROCEDURE — 02H633Z INSERTION OF INFUSION DEVICE INTO RIGHT ATRIUM, PERCUTANEOUS APPROACH: ICD-10-PCS | Performed by: INTERNAL MEDICINE

## 2019-10-02 RX ADMIN — DEXTROSE MONOHYDRATE PRN ML: 500 INJECTION PARENTERAL at 11:41

## 2019-10-02 RX ADMIN — ENOXAPARIN SODIUM SCH MG: 80 INJECTION SUBCUTANEOUS at 12:40

## 2019-10-02 RX ADMIN — Medication SCH STRIP: at 00:30

## 2019-10-02 RX ADMIN — ENOXAPARIN SODIUM SCH MG: 80 INJECTION SUBCUTANEOUS at 10:00

## 2019-10-02 RX ADMIN — PIPERACILLIN SODIUM AND TAZOBACTAM SODIUM SCH MLS/HR: .25; 2 INJECTION, POWDER, LYOPHILIZED, FOR SOLUTION INTRAVENOUS at 00:49

## 2019-10-02 RX ADMIN — Medication SCH STRIP: at 11:38

## 2019-10-02 RX ADMIN — Medication SCH STRIP: at 06:00

## 2019-10-02 RX ADMIN — PIPERACILLIN SODIUM AND TAZOBACTAM SODIUM SCH MLS/HR: .25; 2 INJECTION, POWDER, LYOPHILIZED, FOR SOLUTION INTRAVENOUS at 06:46

## 2019-10-02 RX ADMIN — NOREPINEPHRINE BITARTRATE SCH MLS/HR: 1 INJECTION, SOLUTION, CONCENTRATE INTRAVENOUS at 08:01

## 2019-10-02 RX ADMIN — ALBUMIN (HUMAN) SCH MLS/HR: 0.25 INJECTION, SOLUTION INTRAVENOUS at 06:00

## 2019-10-02 RX ADMIN — HUMAN INSULIN SCH UNITS: 100 INJECTION, SOLUTION SUBCUTANEOUS at 00:30

## 2019-10-02 RX ADMIN — PIPERACILLIN SODIUM AND TAZOBACTAM SODIUM SCH MLS/HR: .25; 2 INJECTION, POWDER, LYOPHILIZED, FOR SOLUTION INTRAVENOUS at 11:38

## 2019-10-02 RX ADMIN — ALBUMIN (HUMAN) SCH MLS/HR: 0.25 INJECTION, SOLUTION INTRAVENOUS at 07:00

## 2019-10-02 RX ADMIN — DEXTROSE MONOHYDRATE PRN ML: 500 INJECTION PARENTERAL at 00:42

## 2019-10-02 RX ADMIN — HUMAN INSULIN SCH UNITS: 100 INJECTION, SOLUTION SUBCUTANEOUS at 06:00

## 2019-10-02 RX ADMIN — LABETALOL HCL SCH MG: 200 TABLET, FILM COATED ORAL at 10:00

## 2019-10-02 RX ADMIN — HUMAN INSULIN SCH UNITS: 100 INJECTION, SOLUTION SUBCUTANEOUS at 11:38

## 2019-10-02 RX ADMIN — PANTOPRAZOLE SODIUM SCH MG: 40 INJECTION, POWDER, FOR SOLUTION INTRAVENOUS at 10:23

## 2019-10-02 NOTE — NUR
Respiratory note:

END OF SHIFT CHECK. UNABLE TO OBTAIN POX READING AT THIS TIME.  ABG DONE AT 0230 PO2 OF 
137.6. ANOTHER ABG IS ORDERED FOR 0530 PER GINETTE TIWARI, DUE TO PTS METABOLIC STATE. WILL 
ENDORSE CARE TO DAY SHIFT RT.

## 2019-10-02 NOTE — NUR
Nutrition Follow-up Notes



Wt.: 90.0 kg today. Noted 5 kg weight gain in last 2 days likely d/t ?fluid retention aeb 
positive I & Os for past few days.

Pt's intubated, non-sedated, currently NPO, noted with EN support temporarily held last 
night d/t high residuals since last night, per nursing. Pt's previously on Jevity 1.2 Henok @ 
30 ml/hr providing 864 kcal, 40 gms pro and 581 ml free water. 



Est. Needs: 1550 kcal to 1900 kcal (20-25 kcal/kgBW), 61 gms to 77 gms pro (0.8-1.0 
gms/kgBW). Will continue to monitor pertinent labs and reassess nutrient need prn



Labs: POC Gluc 69 L, Lactic acid 19.9 H, Mg 3.1 H, Trop I 8.230 H, CO2 28 H, BUN 55 H, Cr 
3.47 H, AST 17033 H, ALT 9642 H,  H; Tpro 5.9 L, Alb 1.8 L; HbA1c 6.5 H



Skin: Adarsh scale 11, high  risk, pt's medial sacrum DTI per RN documentation. Pls refer to 
latest wound care RN's notes for details re: tx plans.

 

GI: Pt's no bowel activity since 9/26/19 per RN documentation. 



PES:

Increased nutrient needs r/t acute/chronic medical condition aeb intubated, sedated, mod  
hypoalbuminemia,NPO.

Altered nutrition related lab values r/t current/chronic medical condition aeb 
hyperglycemia, hyperchloremia, elev. renal labs, lactic acid , AST, mod hypoalbuminemia 



Will continue to monitor NPO status, skin status, pertinent labs and weight trend. F/u in 2 
to 3 days.

Rec.: 1.) If still NPO, consider to resume EN support at lower rate and gradually increase 
feeding rate of Glucerna 1.2 Henok 1.2 Henok to 60 ml/hr goal rate as tolerated when medically 
appropriate. 2.) If Albumin continues trending down with improved renal labs, consider 
Prostat  1 pkt BID. 3.) Advance gradually to oral diet when medically appropriate. 4.) Refer 
to CDE/RD for further nutrition educ. and weight monitoring upon discharge. 5.) Continue 
current plan of care.

## 2019-10-02 NOTE — NUR
HOSPITALIST 



HOSPITALIST CALLED BACK AND GAVE ORDERS  MEQ SODIUM BICARB IN 0.45% NS AT 100ML/HR 
AND 50 MEQ SODIUM BICARB IV PUSH. ORDERS CARRIED OUT, WILL CONTINUE TO MONITOR.

## 2019-10-02 NOTE — NUR
Respiratory note:

UNABLE TO OBTAIN  POX, SHIV HERNÁNDEZ COVERING FOR SHIV DINH MADE AWARE I WILL OBTAIN AN ABG 
TO ASSURE IF PTS SATURATION IS OF CONCERN AT THIS TIME.

## 2019-10-02 NOTE — NUR
CRITICAL LAB AND ABNORMAL 12LEAD



SENT MESSAGE TO DR. MILLER IN REGARDS TO A POTASSIUM LEVEL OF 6.6, AND AN ABNORMAL 12LEAD WITH 
A WIDENING QRS WAVE. AWAITING RESPONSE.

## 2019-10-02 NOTE — NUR
Respiratory note:

PLACED PT ON CO2 MONITORING. STILL UNABLE TO GET A READ ON POX. RN CHEN DINH AWARE OF ALL 
INTERVENTIONS.

## 2019-10-02 NOTE — NUR
Respiratory note:

TERMINAL WEAN ORDER. FAMILY CHOSE NOT TO WATCH. DEEP SUCTIONED AND ORALLY SUCTIONED. 
DEFLATED CUFF AND EXTUBATED. PLACED PT ON 2 LPM NASAL CANULA.

## 2019-10-02 NOTE — NUR
MILO PHAN AT BEDSIDE

TO PRONOUNCE PATIENT.  AT BEDSIDE. ALL QUESTIONS AND CONCERNS ADDRESSED AT THIS TIME

## 2019-10-02 NOTE — NUR
Respiratory note:

AT BEDSIDE FOR VENT CHECK. UNABLE TO OBTAIN ACCURATE POX DUE TO LOW PERFUSION ON POX PROBE 
PLACEMENT SITES. WILL ATTEMPT TO OBTAIN A SATURATION  BY PORTABLE POX.

## 2019-10-02 NOTE — NUR
VASOPRESSORS. 

PER  HE WOULD LIKE PATIENT ON TWO VASOPRESSORS AT THIS TIME AND DOES NOT WANT ANOTHER 
VASOPRESSOR STARTED FOR BLOOD PRESSURE SUPPORT. PATIENT HYPOTENSIVE OR BLOOD PRESSURE UNABLE 
TO READ.  VERBALIZED UNDERSTANDING STATING "ITS ONLY GOING TO PROLONG IT"

## 2019-10-02 NOTE — NUR
ABG

PER  DO NOT ATTEMPT TO GET ABG AT THIS TIME FROM PATIENT.  WOULD LIKE TO KEEP 
PATIENT COMFORTABLE AS POSSIBLE